# Patient Record
Sex: FEMALE | Race: BLACK OR AFRICAN AMERICAN | Employment: FULL TIME | URBAN - METROPOLITAN AREA
[De-identification: names, ages, dates, MRNs, and addresses within clinical notes are randomized per-mention and may not be internally consistent; named-entity substitution may affect disease eponyms.]

---

## 2017-02-06 PROBLEM — R74.8 ELEVATED ALKALINE PHOSPHATASE LEVEL: Status: ACTIVE | Noted: 2017-02-06

## 2017-02-10 ENCOUNTER — TELEPHONE (OUTPATIENT)
Dept: INTERNAL MEDICINE CLINIC | Facility: CLINIC | Age: 47
End: 2017-02-10

## 2017-02-10 DIAGNOSIS — E66.9 OBESITY, UNSPECIFIED OBESITY SEVERITY, UNSPECIFIED OBESITY TYPE: Primary | ICD-10-CM

## 2017-02-10 NOTE — TELEPHONE ENCOUNTER
Referral Request   Received:  Today       [facility] Clinical Staff       Cc: [facility] Referral Pool       Phone Number: [phone]                     .Reason for the order/referral:   PCP: Dr Vance Comp   Refer to Provider (first and last n

## 2017-04-17 ENCOUNTER — OFFICE VISIT (OUTPATIENT)
Dept: INTERNAL MEDICINE CLINIC | Facility: CLINIC | Age: 47
End: 2017-04-17

## 2017-04-17 VITALS
SYSTOLIC BLOOD PRESSURE: 114 MMHG | HEART RATE: 76 BPM | BODY MASS INDEX: 33.21 KG/M2 | WEIGHT: 180.5 LBS | TEMPERATURE: 98 F | RESPIRATION RATE: 14 BRPM | OXYGEN SATURATION: 98 % | HEIGHT: 62 IN | DIASTOLIC BLOOD PRESSURE: 82 MMHG

## 2017-04-17 DIAGNOSIS — J01.10 ACUTE NON-RECURRENT FRONTAL SINUSITIS: Primary | ICD-10-CM

## 2017-04-17 PROCEDURE — 99213 OFFICE O/P EST LOW 20 MIN: CPT | Performed by: FAMILY MEDICINE

## 2017-04-17 RX ORDER — CEFDINIR 300 MG/1
300 CAPSULE ORAL 2 TIMES DAILY
Qty: 20 CAPSULE | Refills: 0 | Status: SHIPPED | OUTPATIENT
Start: 2017-04-17 | End: 2017-05-11

## 2017-04-17 NOTE — PROGRESS NOTES
HPI:    Patient ID: Kristina Dave is a 48year old female. HPI  HPI:   Kristina Dave is a 48year old female who presents for upper respiratory symptoms for  1  months.  Patient reports aches and cough low grade fevers that have not gotten better  ha issues  HEENT: nasal congestion  LUNGS: denies shortness of breath with exertion  CARDIOVASCULAR: denies chest pain   GI: no nausea or abdominal pain  NEURO: denies headaches    EXAM:   /82 mmHg  Pulse 76  Temp(Src) 98.2 °F (36.8 °C) (Oral)  Resp 14

## 2017-05-11 ENCOUNTER — OFFICE VISIT (OUTPATIENT)
Dept: INTERNAL MEDICINE CLINIC | Facility: CLINIC | Age: 47
End: 2017-05-11

## 2017-05-11 VITALS
WEIGHT: 184 LBS | HEIGHT: 62 IN | HEART RATE: 78 BPM | BODY MASS INDEX: 33.86 KG/M2 | SYSTOLIC BLOOD PRESSURE: 118 MMHG | DIASTOLIC BLOOD PRESSURE: 76 MMHG | RESPIRATION RATE: 16 BRPM

## 2017-05-11 DIAGNOSIS — E66.9 OBESITY (BMI 30-39.9): ICD-10-CM

## 2017-05-11 DIAGNOSIS — R94.31 ABNORMAL EKG: ICD-10-CM

## 2017-05-11 DIAGNOSIS — Z51.81 ENCOUNTER FOR THERAPEUTIC DRUG MONITORING: Primary | ICD-10-CM

## 2017-05-11 PROCEDURE — 93000 ELECTROCARDIOGRAM COMPLETE: CPT | Performed by: INTERNAL MEDICINE

## 2017-05-11 PROCEDURE — 99203 OFFICE O/P NEW LOW 30 MIN: CPT | Performed by: INTERNAL MEDICINE

## 2017-05-11 RX ORDER — PHENTERMINE HYDROCHLORIDE 37.5 MG/1
37.5 TABLET ORAL
Qty: 30 TABLET | Refills: 0 | Status: SHIPPED | OUTPATIENT
Start: 2017-05-11 | End: 2017-06-16

## 2017-05-11 NOTE — PROGRESS NOTES
CC: Patient presents with:  Weight Problem       HPI:   Obesity, wt gain over the past 3 years, severity moderate in nature, with associated joints pain, have tried working out and diet with some results.       Wt Readings from Last 6 Encounters:  05/11/17 unspecified     Rheumatoid arthritis(714.0)     Anemia, unspecified     Essential hypertension, benign     Colonic stricture (HCC)     Mild intermittent asthma without complication     Crohn's colitis, with rectal bleeding (HCC)     Elevated alkaline phosp MUSCULOSKELETAL: back is not tender,FROM of the back  EXTREMITIES: no cyanosis, no clubbing, no edema  NEURO: motor and sensory are grossly intact, Reflexes 2+ bilaterally.        Orders Placed This Encounter  HGB A1C  Leptin, Serum  Vitamin B12  VITAMIN

## 2017-05-15 ENCOUNTER — OFFICE VISIT (OUTPATIENT)
Dept: INTERNAL MEDICINE CLINIC | Facility: CLINIC | Age: 47
End: 2017-05-15

## 2017-05-15 VITALS — BODY MASS INDEX: 33 KG/M2 | WEIGHT: 178.63 LBS

## 2017-05-15 DIAGNOSIS — E66.9 OBESITY (BMI 30.0-34.9): ICD-10-CM

## 2017-05-15 PROCEDURE — 97802 MEDICAL NUTRITION INDIV IN: CPT | Performed by: DIETITIAN, REGISTERED

## 2017-05-15 NOTE — PROGRESS NOTES
INITIAL OUTPATIENT NUTRITION CONSULTATION    Nutrition Assessment    Medical Diagnosis: Obesity, NAFLD    Client Hx: 48year old female,  with children    Problem List as of 5/15/2017        Cardiovascular    Essential hypertension, benign disease.     ----------    HDL CHOLESTEROL   Date Value Ref Range Status   04/15/2011 44* > OR = 46 mg/dL Final   ----------    AST   Date Value Ref Range Status   12/08/2016 21 15-37 U/L Final   07/27/2015 21 10 - 35 U/L Final   03/10/2015 19 15-41 U/L Fi diet and eating pattern is now healthy. Describes herself as addicted to sugar. Would stop at store on way home from work nightly and buy several bags of candy and eat to relieve stress. Also ate excess starches, particularly bread.   Has eliminated cand

## 2017-05-25 ENCOUNTER — LAB ENCOUNTER (OUTPATIENT)
Dept: LAB | Age: 47
End: 2017-05-25
Attending: INTERNAL MEDICINE
Payer: COMMERCIAL

## 2017-05-25 DIAGNOSIS — E66.9 OBESITY (BMI 30-39.9): ICD-10-CM

## 2017-05-25 DIAGNOSIS — R74.8 ELEVATED ALKALINE PHOSPHATASE LEVEL: ICD-10-CM

## 2017-05-25 DIAGNOSIS — I10 ESSENTIAL HYPERTENSION, BENIGN: ICD-10-CM

## 2017-05-25 DIAGNOSIS — Z51.81 ENCOUNTER FOR THERAPEUTIC DRUG MONITORING: ICD-10-CM

## 2017-05-25 DIAGNOSIS — K50.111 CROHN'S COLITIS, WITH RECTAL BLEEDING (HCC): ICD-10-CM

## 2017-05-25 PROCEDURE — 80048 BASIC METABOLIC PNL TOTAL CA: CPT

## 2017-05-25 PROCEDURE — 82397 CHEMILUMINESCENT ASSAY: CPT

## 2017-05-25 PROCEDURE — 82306 VITAMIN D 25 HYDROXY: CPT

## 2017-05-25 PROCEDURE — 84443 ASSAY THYROID STIM HORMONE: CPT

## 2017-05-25 PROCEDURE — 83036 HEMOGLOBIN GLYCOSYLATED A1C: CPT

## 2017-05-25 PROCEDURE — 80076 HEPATIC FUNCTION PANEL: CPT

## 2017-05-25 PROCEDURE — 82607 VITAMIN B-12: CPT

## 2017-05-25 PROCEDURE — 36415 COLL VENOUS BLD VENIPUNCTURE: CPT

## 2017-05-25 PROCEDURE — 80061 LIPID PANEL: CPT

## 2017-05-25 PROCEDURE — 85025 COMPLETE CBC W/AUTO DIFF WBC: CPT

## 2017-05-31 ENCOUNTER — HOSPITAL ENCOUNTER (OUTPATIENT)
Dept: CV DIAGNOSTICS | Facility: HOSPITAL | Age: 47
Discharge: HOME | End: 2017-05-31
Attending: INTERNAL MEDICINE
Payer: COMMERCIAL

## 2017-05-31 DIAGNOSIS — R94.31 ABNORMAL EKG: ICD-10-CM

## 2017-05-31 PROCEDURE — 93350 STRESS TTE ONLY: CPT | Performed by: INTERNAL MEDICINE

## 2017-05-31 PROCEDURE — 93018 CV STRESS TEST I&R ONLY: CPT | Performed by: INTERNAL MEDICINE

## 2017-05-31 PROCEDURE — 93017 CV STRESS TEST TRACING ONLY: CPT | Performed by: INTERNAL MEDICINE

## 2017-06-05 RX ORDER — PHENTERMINE HYDROCHLORIDE 37.5 MG/1
TABLET ORAL
Qty: 30 TABLET | Refills: 0 | OUTPATIENT
Start: 2017-06-05

## 2017-06-05 NOTE — PROGRESS NOTES
Quick Note:    Elevated cholesterol  Slightly elevated creatinine, push fluids and recheck BMP in 2 weeks  F/U as directed  ______

## 2017-06-05 NOTE — TELEPHONE ENCOUNTER
Requesting Phentermine   LOV: 5/11/17  RTC: 4 weeks   Last Labs: n/a   Filled: 5/11/17 #30 with 0 refills    Future Appointments  Date Time Provider Maryam Amezcua   6/16/2017 4:45 PM Marcus Álvarez MD [address]   7/20/2017 5:15 PM Marcus Álvarez

## 2017-06-06 ENCOUNTER — TELEPHONE (OUTPATIENT)
Dept: FAMILY MEDICINE CLINIC | Facility: CLINIC | Age: 47
End: 2017-06-06

## 2017-06-06 NOTE — TELEPHONE ENCOUNTER
Patient called regarding her phentermine. She only has 4 pills left and her next appt isn't until 6/16.  She would like to know what she should do since she doesn't have enough pills to last her to her appt and we denied the refill request.

## 2017-06-06 NOTE — TELEPHONE ENCOUNTER
Patient informed that she can stretch out the dosing by taking every other day, if she is out of medication it will not cause harm to her. She will get refill at office visit.

## 2017-06-16 ENCOUNTER — OFFICE VISIT (OUTPATIENT)
Dept: INTERNAL MEDICINE CLINIC | Facility: CLINIC | Age: 47
End: 2017-06-16

## 2017-06-16 VITALS
RESPIRATION RATE: 16 BRPM | HEART RATE: 76 BPM | HEIGHT: 62 IN | BODY MASS INDEX: 31.1 KG/M2 | WEIGHT: 169 LBS | DIASTOLIC BLOOD PRESSURE: 70 MMHG | SYSTOLIC BLOOD PRESSURE: 110 MMHG

## 2017-06-16 DIAGNOSIS — E66.9 OBESITY (BMI 30-39.9): ICD-10-CM

## 2017-06-16 DIAGNOSIS — Z51.81 ENCOUNTER FOR THERAPEUTIC DRUG MONITORING: Primary | ICD-10-CM

## 2017-06-16 DIAGNOSIS — E78.5 HYPERLIPIDEMIA, UNSPECIFIED HYPERLIPIDEMIA TYPE: ICD-10-CM

## 2017-06-16 PROCEDURE — 99213 OFFICE O/P EST LOW 20 MIN: CPT | Performed by: INTERNAL MEDICINE

## 2017-06-16 RX ORDER — PHENTERMINE HYDROCHLORIDE 37.5 MG/1
37.5 TABLET ORAL
Qty: 30 TABLET | Refills: 0 | Status: SHIPPED | OUTPATIENT
Start: 2017-06-16 | End: 2017-07-20

## 2017-06-16 NOTE — PROGRESS NOTES
CC: Patient presents with:  Weight Check: down 15 lb       HPI:   Obesity, doing well on phentermine. No chest pain. Feels like it's helping.        Current Outpatient Prescriptions:  Phentermine HCl 37.5 MG Oral Tab Take 1 tablet (37.5 mg total) by m MG Oral Tab 30 tablet 0      Sig: Take 1 tablet (37.5 mg total) by mouth every morning before breakfast.          None     ASSESSMENT:   Encounter for therapeutic drug monitoring  (primary encounter diagnosis)  Obesity (BMI 30-39. 9)  Hyperlipidemia, unspec

## 2017-06-20 ENCOUNTER — OFFICE VISIT (OUTPATIENT)
Dept: INTERNAL MEDICINE CLINIC | Facility: CLINIC | Age: 47
End: 2017-06-20

## 2017-06-20 VITALS
SYSTOLIC BLOOD PRESSURE: 114 MMHG | OXYGEN SATURATION: 98 % | BODY MASS INDEX: 30 KG/M2 | WEIGHT: 166.5 LBS | TEMPERATURE: 98 F | DIASTOLIC BLOOD PRESSURE: 80 MMHG | HEART RATE: 85 BPM | RESPIRATION RATE: 16 BRPM

## 2017-06-20 DIAGNOSIS — H10.9 CONJUNCTIVITIS OF BOTH EYES, UNSPECIFIED CONJUNCTIVITIS TYPE: Primary | ICD-10-CM

## 2017-06-20 DIAGNOSIS — J45.20 MILD INTERMITTENT ASTHMA WITHOUT COMPLICATION: ICD-10-CM

## 2017-06-20 DIAGNOSIS — L91.8 INFLAMED SKIN TAG: ICD-10-CM

## 2017-06-20 PROCEDURE — 99214 OFFICE O/P EST MOD 30 MIN: CPT | Performed by: FAMILY MEDICINE

## 2017-06-20 RX ORDER — TOBRAMYCIN AND DEXAMETHASONE 3; 1 MG/ML; MG/ML
2 SUSPENSION/ DROPS OPHTHALMIC 4 TIMES DAILY
Qty: 1 BOTTLE | Refills: 0 | Status: SHIPPED | OUTPATIENT
Start: 2017-06-20 | End: 2017-07-20

## 2017-06-20 NOTE — PROGRESS NOTES
CHIEF COMPLAINT:   Patient presents with:  Sty: on right eye since this morning along with right eye crust.  Yellow drainage in both eyes x 1 month. Itchy eyes x 2-3 months. Has tried Claritin and Visine Allergy.          HPI:   Kiera Payment is a 48 ye daily. Disp:  Rfl:    Multiple Vitamin (MULTIVITAMIN OR) Take 1 Tab by mouth daily.  Disp:  Rfl:       Past Medical History   Diagnosis Date   • Sinusitis 9/16/2013   • [facility]    • Extrinsic asthma, unspecified           Past Surgical History AND PLAN:   Gabriela Guardado is a 48year old female who presents with:    ASSESSMENT:   Conjunctivitis of both eyes, unspecified conjunctivitis type  (primary encounter diagnosis)  Inflamed skin tag  Mild intermittent asthma without complication    No orde

## 2017-07-14 ENCOUNTER — TELEPHONE (OUTPATIENT)
Dept: INTERNAL MEDICINE CLINIC | Facility: CLINIC | Age: 47
End: 2017-07-14

## 2017-07-14 DIAGNOSIS — R21 RASH: Primary | ICD-10-CM

## 2017-07-14 NOTE — TELEPHONE ENCOUNTER
Patient Name: Anisha Patel   : 1970   Reason for the order/referral: Rash on body   PCP: Flynn Donald MD   Refer to Provider (first and last name): Recommendation   Specialty: Derm   Patient Insurance: Payor: Katia Vizcarra / Plan: Emil Hoffman /

## 2017-07-20 ENCOUNTER — OFFICE VISIT (OUTPATIENT)
Dept: INTERNAL MEDICINE CLINIC | Facility: CLINIC | Age: 47
End: 2017-07-20

## 2017-07-20 VITALS
RESPIRATION RATE: 16 BRPM | HEART RATE: 86 BPM | HEIGHT: 62 IN | BODY MASS INDEX: 29.63 KG/M2 | SYSTOLIC BLOOD PRESSURE: 122 MMHG | WEIGHT: 161 LBS | DIASTOLIC BLOOD PRESSURE: 76 MMHG

## 2017-07-20 DIAGNOSIS — E66.9 OBESITY (BMI 30-39.9): ICD-10-CM

## 2017-07-20 DIAGNOSIS — Z51.81 ENCOUNTER FOR THERAPEUTIC DRUG MONITORING: Primary | ICD-10-CM

## 2017-07-20 PROCEDURE — 99213 OFFICE O/P EST LOW 20 MIN: CPT | Performed by: INTERNAL MEDICINE

## 2017-07-20 RX ORDER — TOPIRAMATE 25 MG/1
25 TABLET ORAL 2 TIMES DAILY
Qty: 60 TABLET | Refills: 5 | Status: SHIPPED | OUTPATIENT
Start: 2017-07-20 | End: 2017-08-17

## 2017-07-20 RX ORDER — PHENTERMINE HYDROCHLORIDE 37.5 MG/1
37.5 TABLET ORAL
Qty: 30 TABLET | Refills: 0 | Status: SHIPPED | OUTPATIENT
Start: 2017-07-20 | End: 2017-08-17

## 2017-07-20 NOTE — PROGRESS NOTES
CC: Patient presents with:  Weight Check: down 8 lbs       HPI:   Obesity, doing well on phentermine. Worsening cravings later in the day.        Current Outpatient Prescriptions:  Phentermine HCl 37.5 MG Oral Tab Take 1 tablet (37.5 mg total) by mouth Disp Refills    Phentermine HCl 37.5 MG Oral Tab 30 tablet 0      Sig: Take 1 tablet (37.5 mg total) by mouth every morning before breakfast.      topiramate (TOPAMAX) 25 MG Oral Tab 60 tablet 5      Sig: Take 1 tablet (25 mg total) by mouth 2 (two) times

## 2017-08-09 RX ORDER — LISINOPRIL 40 MG/1
40 TABLET ORAL DAILY
Qty: 90 TABLET | Refills: 0 | Status: SHIPPED | OUTPATIENT
Start: 2017-08-09 | End: 2017-09-14

## 2017-08-14 RX ORDER — LISINOPRIL 40 MG/1
40 TABLET ORAL DAILY
Qty: 90 TABLET | Refills: 0 | OUTPATIENT
Start: 2017-08-14

## 2017-08-17 ENCOUNTER — OFFICE VISIT (OUTPATIENT)
Dept: INTERNAL MEDICINE CLINIC | Facility: CLINIC | Age: 47
End: 2017-08-17

## 2017-08-17 VITALS
HEIGHT: 62 IN | HEART RATE: 84 BPM | BODY MASS INDEX: 28.71 KG/M2 | SYSTOLIC BLOOD PRESSURE: 116 MMHG | DIASTOLIC BLOOD PRESSURE: 70 MMHG | WEIGHT: 156 LBS | RESPIRATION RATE: 16 BRPM

## 2017-08-17 DIAGNOSIS — E66.9 OBESITY (BMI 30-39.9): ICD-10-CM

## 2017-08-17 DIAGNOSIS — Z51.81 ENCOUNTER FOR THERAPEUTIC DRUG MONITORING: Primary | ICD-10-CM

## 2017-08-17 PROCEDURE — 99213 OFFICE O/P EST LOW 20 MIN: CPT | Performed by: INTERNAL MEDICINE

## 2017-08-17 RX ORDER — PHENTERMINE HYDROCHLORIDE 37.5 MG/1
37.5 TABLET ORAL
Qty: 30 TABLET | Refills: 0 | Status: SHIPPED | OUTPATIENT
Start: 2017-08-17 | End: 2017-09-14

## 2017-08-17 RX ORDER — TOPIRAMATE 50 MG/1
50 TABLET, FILM COATED ORAL 2 TIMES DAILY
Qty: 60 TABLET | Refills: 5 | Status: SHIPPED | OUTPATIENT
Start: 2017-08-17 | End: 2018-04-16

## 2017-08-17 NOTE — PROGRESS NOTES
CC: Patient presents with:  Weight Check: down 5 lbs       HPI:   Obesity, doing well on phentermine and topamax, worsening hunger still later in the day.        Current Outpatient Prescriptions:  topiramate 50 MG Oral Tab Take 1 tablet (50 mg total) by Prescriptions Disp Refills    topiramate 50 MG Oral Tab 60 tablet 5      Sig: Take 1 tablet (50 mg total) by mouth 2 (two) times daily.       Phentermine HCl 37.5 MG Oral Tab 30 tablet 0      Sig: Take 1 tablet (37.5 mg total) by mouth every morning before

## 2017-09-03 ENCOUNTER — APPOINTMENT (OUTPATIENT)
Dept: CT IMAGING | Age: 47
End: 2017-09-03
Attending: EMERGENCY MEDICINE
Payer: COMMERCIAL

## 2017-09-03 ENCOUNTER — HOSPITAL ENCOUNTER (EMERGENCY)
Age: 47
Discharge: HOME | End: 2017-09-03
Attending: EMERGENCY MEDICINE
Payer: COMMERCIAL

## 2017-09-03 VITALS
TEMPERATURE: 98 F | WEIGHT: 152 LBS | RESPIRATION RATE: 18 BRPM | HEART RATE: 89 BPM | HEIGHT: 62 IN | DIASTOLIC BLOOD PRESSURE: 74 MMHG | OXYGEN SATURATION: 99 % | BODY MASS INDEX: 27.97 KG/M2 | SYSTOLIC BLOOD PRESSURE: 125 MMHG

## 2017-09-03 DIAGNOSIS — R51.9 NONINTRACTABLE HEADACHE, UNSPECIFIED CHRONICITY PATTERN, UNSPECIFIED HEADACHE TYPE: Primary | ICD-10-CM

## 2017-09-03 DIAGNOSIS — R22.0 FACIAL MASS: ICD-10-CM

## 2017-09-03 PROCEDURE — 70450 CT HEAD/BRAIN W/O DYE: CPT | Performed by: EMERGENCY MEDICINE

## 2017-09-03 PROCEDURE — 99284 EMERGENCY DEPT VISIT MOD MDM: CPT

## 2017-09-03 RX ORDER — TRAMADOL HYDROCHLORIDE 50 MG/1
50 TABLET ORAL EVERY 4 HOURS PRN
Qty: 20 TABLET | Refills: 0 | Status: SHIPPED | OUTPATIENT
Start: 2017-09-03 | End: 2017-09-10

## 2017-09-03 RX ORDER — AMOXICILLIN 500 MG/1
500 TABLET, FILM COATED ORAL 3 TIMES DAILY
Qty: 30 TABLET | Refills: 0 | Status: SHIPPED | OUTPATIENT
Start: 2017-09-03 | End: 2017-09-13

## 2017-09-03 NOTE — ED PROVIDER NOTES
Patient Seen in: [facility] [place]    History   Patient presents with:  Headache (neurologic)  Rash Skin Problem (integumentary)    Stated Complaint: HEADACHE X2 WEEKS, RASH ON UPPER TORSO FOR PAST WEEK AND RECENTLY DX WITH DERMA* Subcutaneous Pen-injector Kit,  Inject into the skin. amoxicillin 500 MG Oral Tab,  Take 1 tablet (500 mg total) by mouth 3 (three) times daily. TraMADol HCl 50 MG Oral Tab,  Take 1 tablet (50 mg total) by mouth every 4 (four) hours as needed for Pain. 1653]  BP: 127/76  Pulse: 99  Resp: 16  Temp: 98 °F (36.7 °C)  Temp src: Temporal  SpO2: 98 %  O2 Device: None (Room air)    Current:/74   Pulse 89   Temp 98 °F (36.7 °C) (Temporal)   Resp 18   Ht 157.5 cm (5' 2\")   Wt 68.9 kg   SpO2 99%   BMI 27.80 represent a reactive lymph node among other benign or malignant etiologies. Clinical correlation with physical exam and outpatient followup is recommended.      Dictated by: Torri Hartman MD on 9/03/2017 at 18:24     Approved by: Torri Hartman MD

## 2017-09-14 ENCOUNTER — OFFICE VISIT (OUTPATIENT)
Dept: INTERNAL MEDICINE CLINIC | Facility: CLINIC | Age: 47
End: 2017-09-14

## 2017-09-14 VITALS
BODY MASS INDEX: 27.79 KG/M2 | HEIGHT: 62 IN | DIASTOLIC BLOOD PRESSURE: 78 MMHG | WEIGHT: 151 LBS | HEART RATE: 78 BPM | SYSTOLIC BLOOD PRESSURE: 118 MMHG | RESPIRATION RATE: 16 BRPM

## 2017-09-14 DIAGNOSIS — Z51.81 ENCOUNTER FOR THERAPEUTIC DRUG MONITORING: Primary | ICD-10-CM

## 2017-09-14 DIAGNOSIS — E66.9 OBESITY (BMI 30-39.9): ICD-10-CM

## 2017-09-14 DIAGNOSIS — I10 ESSENTIAL HYPERTENSION, BENIGN: ICD-10-CM

## 2017-09-14 PROCEDURE — 99213 OFFICE O/P EST LOW 20 MIN: CPT | Performed by: INTERNAL MEDICINE

## 2017-09-14 RX ORDER — PHENTERMINE HYDROCHLORIDE 37.5 MG/1
37.5 TABLET ORAL
Qty: 30 TABLET | Refills: 0 | Status: SHIPPED | OUTPATIENT
Start: 2017-09-14 | End: 2018-04-16

## 2017-09-14 RX ORDER — LISINOPRIL 20 MG/1
40 TABLET ORAL DAILY
Qty: 30 TABLET | Refills: 3 | Status: SHIPPED | OUTPATIENT
Start: 2017-09-14 | End: 2017-12-20

## 2017-09-14 NOTE — PROGRESS NOTES
CC: Patient presents with:  Weight Check: down 5 lb       HPI:   Obesity, doing well on phentermine and topamax. Some dizziness with standing at times.        Current Outpatient Prescriptions:  Phentermine HCl 37.5 MG Oral Tab Take 1 tablet (37.5 mg tot therapeutic drug monitoring     Hyperlipidemia        REVIEW OF SYSTEMS:   CARDIOVASCULAR: denies chest pain    EXAM:   /78   Pulse 78   Resp 16   Ht 62\"   Wt 151 lb   BMI 27.62 kg/m²   GENERAL: A/O x3  HEENT:  NCAT, PERRLA  LUNGS: CTA bilat   CARDI

## 2017-10-05 ENCOUNTER — OFFICE VISIT (OUTPATIENT)
Dept: INTERNAL MEDICINE CLINIC | Facility: CLINIC | Age: 47
End: 2017-10-05

## 2017-10-05 VITALS
TEMPERATURE: 99 F | HEART RATE: 88 BPM | HEIGHT: 62 IN | BODY MASS INDEX: 27.97 KG/M2 | RESPIRATION RATE: 15 BRPM | DIASTOLIC BLOOD PRESSURE: 82 MMHG | WEIGHT: 152 LBS | SYSTOLIC BLOOD PRESSURE: 134 MMHG

## 2017-10-05 DIAGNOSIS — R59.9 ENLARGED LYMPH NODE: ICD-10-CM

## 2017-10-05 DIAGNOSIS — B02.8 HERPES ZOSTER WITH COMPLICATION: Primary | ICD-10-CM

## 2017-10-05 PROCEDURE — 99213 OFFICE O/P EST LOW 20 MIN: CPT | Performed by: FAMILY MEDICINE

## 2017-10-05 RX ORDER — HYDROCODONE BITARTRATE AND ACETAMINOPHEN 5; 325 MG/1; MG/1
1 TABLET ORAL EVERY 4 HOURS PRN
Qty: 30 TABLET | Refills: 0 | Status: SHIPPED | OUTPATIENT
Start: 2017-10-05 | End: 2018-01-25

## 2017-10-05 RX ORDER — VALACYCLOVIR HYDROCHLORIDE 1 G/1
1 TABLET, FILM COATED ORAL 3 TIMES DAILY
Qty: 21 TABLET | Refills: 0 | Status: SHIPPED | OUTPATIENT
Start: 2017-10-05 | End: 2017-10-12

## 2017-10-05 NOTE — PROGRESS NOTES
HPI:    Patient ID: Anisha Patel is a 50year old female.     HPI  Here for rash for a week  But was having itching prior to it   This is more generlized on the torso  But has a rash on the R medial elbow that is painful  Cannot touch it w clothes   No D Allergies:  Peanuts                 Nausea and vomiting, Swelling,                            Tongue Swelling   PHYSICAL EXAM:   Physical Exam   Constitutional: No distress. HENT:   LN noted in preauricular area    Neurological: She is alert.    Skin:

## 2017-10-23 DIAGNOSIS — R21 RASH AND OTHER NONSPECIFIC SKIN ERUPTION: Primary | ICD-10-CM

## 2017-10-30 PROCEDURE — 80299 QUANTITATIVE ASSAY DRUG: CPT | Performed by: INTERNAL MEDICINE

## 2017-12-20 ENCOUNTER — TELEPHONE (OUTPATIENT)
Dept: INTERNAL MEDICINE CLINIC | Facility: CLINIC | Age: 47
End: 2017-12-20

## 2017-12-20 RX ORDER — LISINOPRIL 20 MG/1
40 TABLET ORAL DAILY
Qty: 30 TABLET | Refills: 3 | Status: CANCELLED | OUTPATIENT
Start: 2017-12-20

## 2017-12-20 RX ORDER — LISINOPRIL 20 MG/1
40 TABLET ORAL DAILY
Qty: 30 TABLET | Refills: 3 | Status: SHIPPED | OUTPATIENT
Start: 2017-12-20 | End: 2018-04-16

## 2017-12-22 RX ORDER — LISINOPRIL 40 MG/1
TABLET ORAL
Qty: 90 TABLET | Refills: 0 | OUTPATIENT
Start: 2017-12-22

## 2018-01-08 ENCOUNTER — LAB ENCOUNTER (OUTPATIENT)
Dept: LAB | Facility: HOSPITAL | Age: 48
End: 2018-01-08
Attending: INTERNAL MEDICINE
Payer: COMMERCIAL

## 2018-01-08 DIAGNOSIS — Z51.81 THERAPEUTIC DRUG MONITORING: ICD-10-CM

## 2018-01-08 DIAGNOSIS — R74.8 ELEVATED ALKALINE PHOSPHATASE LEVEL: ICD-10-CM

## 2018-01-08 DIAGNOSIS — K50.111 CROHN'S COLITIS, WITH RECTAL BLEEDING (HCC): ICD-10-CM

## 2018-01-08 LAB
ALBUMIN SERPL-MCNC: 3.6 G/DL (ref 3.5–4.8)
ALP LIVER SERPL-CCNC: 123 U/L (ref 39–100)
ALT SERPL-CCNC: 36 U/L (ref 14–54)
AST SERPL-CCNC: 24 U/L (ref 15–41)
BASOPHILS # BLD AUTO: 0.09 X10(3) UL (ref 0–0.1)
BASOPHILS NFR BLD AUTO: 0.9 %
BILIRUB DIRECT SERPL-MCNC: 0.1 MG/DL (ref 0.1–0.5)
BILIRUB SERPL-MCNC: 0.5 MG/DL (ref 0.1–2)
EOSINOPHIL # BLD AUTO: 1.23 X10(3) UL (ref 0–0.3)
EOSINOPHIL NFR BLD AUTO: 12.4 %
ERYTHROCYTE [DISTWIDTH] IN BLOOD BY AUTOMATED COUNT: 12.2 % (ref 11.5–16)
HCT VFR BLD AUTO: 36.1 % (ref 34–50)
HGB BLD-MCNC: 12.1 G/DL (ref 12–16)
IMMATURE GRANULOCYTE COUNT: 0.1 X10(3) UL (ref 0–1)
IMMATURE GRANULOCYTE RATIO %: 1 %
LYMPHOCYTES # BLD AUTO: 3.83 X10(3) UL (ref 0.9–4)
LYMPHOCYTES NFR BLD AUTO: 38.7 %
M PROTEIN MFR SERPL ELPH: 7.4 G/DL (ref 6.1–8.3)
MCH RBC QN AUTO: 33.7 PG (ref 27–33.2)
MCHC RBC AUTO-ENTMCNC: 33.5 G/DL (ref 31–37)
MCV RBC AUTO: 100.6 FL (ref 81–100)
MONOCYTES # BLD AUTO: 0.5 X10(3) UL (ref 0.1–0.6)
MONOCYTES NFR BLD AUTO: 5.1 %
NEUTROPHIL ABS PRELIM: 4.15 X10 (3) UL (ref 1.3–6.7)
NEUTROPHILS # BLD AUTO: 4.15 X10(3) UL (ref 1.3–6.7)
NEUTROPHILS NFR BLD AUTO: 41.9 %
PLATELET # BLD AUTO: 336 10(3)UL (ref 150–450)
RBC # BLD AUTO: 3.59 X10(6)UL (ref 3.8–5.1)
RED CELL DISTRIBUTION WIDTH-SD: 45.7 FL (ref 35.1–46.3)
WBC # BLD AUTO: 9.9 X10(3) UL (ref 4–13)

## 2018-01-08 PROCEDURE — 85025 COMPLETE CBC W/AUTO DIFF WBC: CPT

## 2018-01-08 PROCEDURE — 86352 CELL FUNCTION ASSAY W/STIM: CPT

## 2018-01-08 PROCEDURE — 36415 COLL VENOUS BLD VENIPUNCTURE: CPT

## 2018-01-08 PROCEDURE — 80076 HEPATIC FUNCTION PANEL: CPT

## 2018-01-11 LAB
ADALIMUMAB ACTIVITY: 7.83 UG/ML
ADALIMUMAB NEUTRALIZING ANTIBODY: NOT DETECTED

## 2018-01-29 PROCEDURE — 88305 TISSUE EXAM BY PATHOLOGIST: CPT | Performed by: INTERNAL MEDICINE

## 2018-04-10 ENCOUNTER — PATIENT OUTREACH (OUTPATIENT)
Dept: INTERNAL MEDICINE CLINIC | Facility: CLINIC | Age: 48
End: 2018-04-10

## 2018-04-16 ENCOUNTER — OFFICE VISIT (OUTPATIENT)
Dept: INTERNAL MEDICINE CLINIC | Facility: CLINIC | Age: 48
End: 2018-04-16

## 2018-04-16 VITALS
WEIGHT: 182 LBS | TEMPERATURE: 98 F | BODY MASS INDEX: 33 KG/M2 | OXYGEN SATURATION: 99 % | SYSTOLIC BLOOD PRESSURE: 106 MMHG | HEART RATE: 93 BPM | DIASTOLIC BLOOD PRESSURE: 76 MMHG

## 2018-04-16 DIAGNOSIS — I10 ESSENTIAL HYPERTENSION, BENIGN: Primary | ICD-10-CM

## 2018-04-16 DIAGNOSIS — Z12.31 VISIT FOR SCREENING MAMMOGRAM: ICD-10-CM

## 2018-04-16 PROCEDURE — 99213 OFFICE O/P EST LOW 20 MIN: CPT | Performed by: FAMILY MEDICINE

## 2018-04-16 RX ORDER — LISINOPRIL 20 MG/1
20 TABLET ORAL DAILY
Qty: 90 TABLET | Refills: 1 | Status: SHIPPED | OUTPATIENT
Start: 2018-04-16 | End: 2018-12-06

## 2018-04-16 NOTE — PROGRESS NOTES
HPI:    Patient ID: Kristina Dave is a 50year old female. HPI  Kristina Dave is a 50year old female. HPI:   Patient presents for recheck of her hypertension.  Pt has been taking medication as instructed, no medication side effects, no home BP m 3   Albuterol Sulfate HFA (PROAIR HFA) 108 (90 BASE) MCG/ACT Inhalation Aero Soln Inhale 2 puffs into the lungs every 4 (four) hours as needed. For wheezing Disp: 6.7 g Rfl: 2   Cetirizine HCl (ZYRTEC ALLERGY OR) Take by mouth daily.  Disp:  Rfl:    Multipl her hypertension. BP is well controlled, no significant medication side effects noted. PLAN: will continue present medications, ck labs  soon as well as  MMG   discussed weight gain   .  The patient indicates understanding of these issues and agrees to the BILAT ([id number])       [id number]

## 2018-04-19 ENCOUNTER — APPOINTMENT (OUTPATIENT)
Dept: CT IMAGING | Age: 48
End: 2018-04-19
Attending: EMERGENCY MEDICINE
Payer: COMMERCIAL

## 2018-04-19 ENCOUNTER — HOSPITAL ENCOUNTER (EMERGENCY)
Age: 48
Discharge: HOME | End: 2018-04-19
Attending: EMERGENCY MEDICINE
Payer: COMMERCIAL

## 2018-04-19 VITALS
HEIGHT: 62 IN | TEMPERATURE: 98 F | SYSTOLIC BLOOD PRESSURE: 110 MMHG | WEIGHT: 182 LBS | HEART RATE: 74 BPM | BODY MASS INDEX: 33.49 KG/M2 | RESPIRATION RATE: 18 BRPM | OXYGEN SATURATION: 100 % | DIASTOLIC BLOOD PRESSURE: 81 MMHG

## 2018-04-19 DIAGNOSIS — R19.7 DIARRHEA, UNSPECIFIED TYPE: ICD-10-CM

## 2018-04-19 DIAGNOSIS — N39.0 URINARY TRACT INFECTION WITHOUT HEMATURIA, SITE UNSPECIFIED: Primary | ICD-10-CM

## 2018-04-19 PROCEDURE — 87077 CULTURE AEROBIC IDENTIFY: CPT | Performed by: EMERGENCY MEDICINE

## 2018-04-19 PROCEDURE — 87086 URINE CULTURE/COLONY COUNT: CPT | Performed by: EMERGENCY MEDICINE

## 2018-04-19 PROCEDURE — 96361 HYDRATE IV INFUSION ADD-ON: CPT

## 2018-04-19 PROCEDURE — 85025 COMPLETE CBC W/AUTO DIFF WBC: CPT | Performed by: EMERGENCY MEDICINE

## 2018-04-19 PROCEDURE — 80053 COMPREHEN METABOLIC PANEL: CPT | Performed by: EMERGENCY MEDICINE

## 2018-04-19 PROCEDURE — 87186 SC STD MICRODIL/AGAR DIL: CPT | Performed by: EMERGENCY MEDICINE

## 2018-04-19 PROCEDURE — 81001 URINALYSIS AUTO W/SCOPE: CPT | Performed by: EMERGENCY MEDICINE

## 2018-04-19 PROCEDURE — 96375 TX/PRO/DX INJ NEW DRUG ADDON: CPT

## 2018-04-19 PROCEDURE — 99284 EMERGENCY DEPT VISIT MOD MDM: CPT

## 2018-04-19 PROCEDURE — 74177 CT ABD & PELVIS W/CONTRAST: CPT | Performed by: EMERGENCY MEDICINE

## 2018-04-19 PROCEDURE — 96374 THER/PROPH/DIAG INJ IV PUSH: CPT

## 2018-04-19 RX ORDER — CEPHALEXIN 500 MG/1
500 CAPSULE ORAL 4 TIMES DAILY
Qty: 40 CAPSULE | Refills: 0 | Status: SHIPPED | OUTPATIENT
Start: 2018-04-19 | End: 2018-04-29

## 2018-04-19 RX ORDER — ACETAMINOPHEN 500 MG
1000 TABLET ORAL ONCE
Status: DISCONTINUED | OUTPATIENT
Start: 2018-04-19 | End: 2018-04-19

## 2018-04-19 RX ORDER — ONDANSETRON 2 MG/ML
4 INJECTION INTRAMUSCULAR; INTRAVENOUS ONCE
Status: COMPLETED | OUTPATIENT
Start: 2018-04-19 | End: 2018-04-19

## 2018-04-19 RX ORDER — HYDROMORPHONE HYDROCHLORIDE 1 MG/ML
0.5 INJECTION, SOLUTION INTRAMUSCULAR; INTRAVENOUS; SUBCUTANEOUS ONCE
Status: COMPLETED | OUTPATIENT
Start: 2018-04-19 | End: 2018-04-19

## 2018-04-19 NOTE — ED PROVIDER NOTES
Patient Seen in: [facility] In [place]    History   Patient presents with:  Abdomen/Flank Pain (GI/)    Stated Complaint: abd pain     HPI    43-year-old female who presented to the emergency room complaining of having 1 week of lef Vitals [04/19/18 4622]  BP: (!) 130/102  Pulse: 95  Resp: 16  Temp: (!) 97.5 °F (36.4 °C)  Temp src: Temporal  SpO2: 99 %  O2 Device: None (Room air)    Current:/81   Pulse 74   Temp (!) 97.5 °F (36.4 °C) (Temporal)   Resp 18   Ht 157.5 cm (5' 2\") other components within normal limits   CBC WITH DIFFERENTIAL WITH PLATELET    Narrative: The following orders were created for panel order CBC WITH DIFFERENTIAL WITH PLATELET.   Procedure                               Abnormality         Status immediate for any worsening symptoms. She was discharged in good condition.       Disposition and Plan     Clinical Impression:  Urinary tract infection without hematuria, site unspecified  (primary encounter diagnosis)  Diarrhea, unspecified type    Dispo

## 2018-04-19 NOTE — ED INITIAL ASSESSMENT (HPI)
Pt reports since Friday having left lower abd pain and watery diarrhea.  No blood in stool, nausea, denies travel

## 2018-09-12 ENCOUNTER — OFFICE VISIT (OUTPATIENT)
Dept: INTERNAL MEDICINE CLINIC | Facility: CLINIC | Age: 48
End: 2018-09-12

## 2018-09-12 VITALS
DIASTOLIC BLOOD PRESSURE: 82 MMHG | OXYGEN SATURATION: 98 % | BODY MASS INDEX: 32.57 KG/M2 | RESPIRATION RATE: 16 BRPM | TEMPERATURE: 98 F | HEART RATE: 94 BPM | SYSTOLIC BLOOD PRESSURE: 120 MMHG | WEIGHT: 177 LBS | HEIGHT: 62 IN

## 2018-09-12 DIAGNOSIS — Z12.31 VISIT FOR SCREENING MAMMOGRAM: ICD-10-CM

## 2018-09-12 DIAGNOSIS — I10 ESSENTIAL HYPERTENSION, BENIGN: Primary | ICD-10-CM

## 2018-09-12 DIAGNOSIS — R51.9 NONINTRACTABLE HEADACHE, UNSPECIFIED CHRONICITY PATTERN, UNSPECIFIED HEADACHE TYPE: ICD-10-CM

## 2018-09-12 PROCEDURE — 99214 OFFICE O/P EST MOD 30 MIN: CPT | Performed by: FAMILY MEDICINE

## 2018-09-12 RX ORDER — AMOXICILLIN 875 MG/1
875 TABLET, COATED ORAL 2 TIMES DAILY
Qty: 20 TABLET | Refills: 0 | Status: SHIPPED | OUTPATIENT
Start: 2018-09-12 | End: 2018-09-22

## 2018-09-12 NOTE — PROGRESS NOTES
HPI:    Patient ID: Ty Silveira is a 46year old female.     HPI  Here for BRANDT     Has had in the past      Entire head is involved    Woke up w it 2 d ago   Constant   Tried allegra benadryl, tylenol      Some dizziness       Pins on the R top o f the h ASSESSMENT/PLAN:  (I10) Essential hypertension, benign  (primary encounter diagnosis)  Plan: BP is OK   CPM       (R51) Nonintractable headache, unspecified chronicity pattern, unspecified headache type  Plan: she had similar presntation to the ER last S

## 2018-12-06 RX ORDER — LISINOPRIL 20 MG/1
TABLET ORAL
Qty: 90 TABLET | Refills: 0 | Status: SHIPPED | OUTPATIENT
Start: 2018-12-06 | End: 2019-03-27

## 2018-12-06 NOTE — TELEPHONE ENCOUNTER
Lisinopril approved for refill per protocol Office progress note,  May 7, 2018    Na returns today for further management of her mild microcytic anemia. Her ferritin was low at 8 and iron saturation was only 9%, so she was started on oral ferrous sulfate 3 times a day. The rest of her labs ordered on Sasha 15, 2017, were pretty unremarkable including a normal LDH negative direct Kat test, and normal levels of folate and B12. Protein electrophoresis did not demonstrate a paraprotein. Retic count was also normal at 1.8%, sedimentation rate was a little bit elevated at 39.  She was seen back in July after being on oral iron for about 3 weeks. Her hematocrit is 36, hemoglobin 11.1, which was at least stable. She continued on the iron for an additional 10 months, she is feeling well except she was  diagnosed with atrial fibrillation, more recently with frequent PVCs. An ablation is being planned. She's not had any dizziness or chest pain.     Physical exam today shows a pleasant white female in no acute distress her blood pressure is 139/67 pulses are 77 and regular. Respirations are 20, temperature 98.1. HEENT exam shows no scleral icterus. Oropharynx is benign. Neck is supple without adenopathy or thyromegaly. Back is nontender, lungs are clear to percussion and auscultation. Breast exam is deferred. Cardiac tones show an irregularly irregular rhythm with a moderate tachycardia. Abdomen is soft without hepatosplenomegaly. Rectovaginal exam is deferred. Extremities show no pedal edema or calf tenderness. Neurologically patient is alert and oriented. There is no focal weakness. Gait speech and cranial nerve functions are intact.    Laboratory data today shows a CBC with a white count 4200, hematocrit 42.6, hemoglobin 12.9, platelets 147,000. INR is 2.2.    Assess    Iron deficiency anemia, possibly with a small component of anemia of chronic disease, improved now on oral iron supplementation.    She also has recently diagnosed atrial fibrillation, Reggie oakley  is now controlled except she is also having frequent PVCs.    Plan:    At this point I think we can safely stop her iron. She will see me back in 6 months. We will recheck her iron levels at that time. She denies any further bleeding except for some blood in the urine on two brief occasions in the last month or so. Stool was guaiac-negative back last November. INR is therapeutic. She has had an EGD and a colonoscopy within the last 2 years. Call she notices further bleeding. She's not had chest pain or dizziness.

## 2018-12-07 RX ORDER — NEBULIZER ACCESSORIES
KIT MISCELLANEOUS
Qty: 1 KIT | Refills: 0 | Status: SHIPPED | OUTPATIENT
Start: 2018-12-07 | End: 2021-03-31

## 2018-12-07 RX ORDER — ALBUTEROL SULFATE 90 UG/1
2 AEROSOL, METERED RESPIRATORY (INHALATION) EVERY 4 HOURS PRN
Qty: 6.7 G | Refills: 2 | Status: SHIPPED | OUTPATIENT
Start: 2018-12-07 | End: 2020-02-07

## 2018-12-19 ENCOUNTER — TELEPHONE (OUTPATIENT)
Dept: INTERNAL MEDICINE CLINIC | Facility: CLINIC | Age: 48
End: 2018-12-19

## 2018-12-19 NOTE — TELEPHONE ENCOUNTER
Patient stated that he prescription for LISINOPRIL 20 MG Oral Tab has been denied and she would like to speak with a nurse to see why it was denied. Patient stated that she is out of her prescription.  [facility] [address] [place] [address]

## 2018-12-19 NOTE — TELEPHONE ENCOUNTER
Rx was filled on 12/06/18 for 90 pills Spoke with pharmacy and they have Rx and will get it ready for her She has 2 files with them and needs to correct this One file under Kimberly Gonzales was denied

## 2019-02-07 ENCOUNTER — OFFICE VISIT (OUTPATIENT)
Dept: INTERNAL MEDICINE CLINIC | Facility: CLINIC | Age: 49
End: 2019-02-07

## 2019-02-07 VITALS
HEART RATE: 84 BPM | WEIGHT: 186 LBS | RESPIRATION RATE: 13 BRPM | OXYGEN SATURATION: 98 % | HEIGHT: 62 IN | SYSTOLIC BLOOD PRESSURE: 126 MMHG | BODY MASS INDEX: 34.23 KG/M2 | TEMPERATURE: 99 F | DIASTOLIC BLOOD PRESSURE: 84 MMHG

## 2019-02-07 DIAGNOSIS — J01.90 ACUTE SINUSITIS, RECURRENCE NOT SPECIFIED, UNSPECIFIED LOCATION: ICD-10-CM

## 2019-02-07 DIAGNOSIS — H00.11 CHALAZION OF RIGHT UPPER EYELID: ICD-10-CM

## 2019-02-07 DIAGNOSIS — H10.9 BACTERIAL CONJUNCTIVITIS OF RIGHT EYE: Primary | ICD-10-CM

## 2019-02-07 PROCEDURE — 99213 OFFICE O/P EST LOW 20 MIN: CPT | Performed by: FAMILY MEDICINE

## 2019-02-07 RX ORDER — TOBRAMYCIN 3 MG/ML
1 SOLUTION/ DROPS OPHTHALMIC 3 TIMES DAILY
Qty: 1 BOTTLE | Refills: 0 | Status: SHIPPED | OUTPATIENT
Start: 2019-02-07 | End: 2019-02-14

## 2019-02-07 RX ORDER — AMOXICILLIN AND CLAVULANATE POTASSIUM 875; 125 MG/1; MG/1
1 TABLET, FILM COATED ORAL 2 TIMES DAILY
Qty: 20 TABLET | Refills: 0 | Status: SHIPPED | OUTPATIENT
Start: 2019-02-07 | End: 2019-02-22

## 2019-02-07 NOTE — PROGRESS NOTES
CHIEF COMPLAINT:   Patient presents with:  Conjunctivitis: R side/ x3 days/ discharge at am/ itchiness/ swollen. HPI:   Wild Marshall is a 46year old female who presents with chief complaint of eye irritation. Symptoms began  4  days ago.  Symptoms daily. Disp: 45 g Rfl: 3   Fluocinolone Acetonide (DERMA-SMOOTHE/FS SCALP) 0.01 % External Oil Apply to scalp q hs Disp: 1 Bottle Rfl: 3   Cetirizine HCl (ZYRTEC ALLERGY OR) Take by mouth daily.  Disp:  Rfl:    Respiratory Therapy Supplies (NEBULIZER/TUBING pain  NEURO: denies headaches     EXAM:   /84 (BP Location: Left arm, Patient Position: Sitting, Cuff Size: adult)   Pulse 84   Temp 98.7 °F (37.1 °C) (Oral)   Resp 13   Ht 62\"   Wt 186 lb   SpO2 98%   BMI 34.02 kg/m²   GENERAL: well developed, well Dispense: 1 Bottle; Refill: 0    2. Acute sinusitis, recurrence not specified, unspecified location  Increase fluid intake, humidify air, have warm tea with honey  - Amoxicillin-Pot Clavulanate (AUGMENTIN) 875-125 MG Oral Tab;  Take 1 tablet by mouth 2 (two

## 2019-02-22 ENCOUNTER — TELEPHONE (OUTPATIENT)
Dept: INTERNAL MEDICINE CLINIC | Facility: CLINIC | Age: 49
End: 2019-02-22

## 2019-02-22 RX ORDER — CLINDAMYCIN HYDROCHLORIDE 150 MG/1
150 CAPSULE ORAL 3 TIMES DAILY
Qty: 30 CAPSULE | Refills: 0 | Status: SHIPPED | OUTPATIENT
Start: 2019-02-22 | End: 2019-03-04

## 2019-02-22 NOTE — TELEPHONE ENCOUNTER
Pt calling with update took abx for 10 days was getting better for the first 6 days then no improvement .  R eye is swollen again but the lower lid same eye , Both ears hurt again R>L

## 2019-02-22 NOTE — TELEPHONE ENCOUNTER
Pt was seen 2wks ago and was put on antibiotics and was asked to call back if symptoms do not get better and have not would like a [place] [facility]

## 2019-03-27 RX ORDER — LISINOPRIL 20 MG/1
TABLET ORAL
Qty: 90 TABLET | Refills: 0 | Status: SHIPPED | OUTPATIENT
Start: 2019-03-27 | End: 2019-06-25

## 2019-05-07 ENCOUNTER — TELEPHONE (OUTPATIENT)
Dept: INTERNAL MEDICINE CLINIC | Facility: CLINIC | Age: 49
End: 2019-05-07

## 2019-05-24 ENCOUNTER — LAB ENCOUNTER (OUTPATIENT)
Dept: LAB | Facility: HOSPITAL | Age: 49
End: 2019-05-24
Attending: INTERNAL MEDICINE
Payer: COMMERCIAL

## 2019-05-24 DIAGNOSIS — K50.10 CROHN'S DISEASE OF COLON WITHOUT COMPLICATION (HCC): ICD-10-CM

## 2019-05-24 PROCEDURE — 80076 HEPATIC FUNCTION PANEL: CPT

## 2019-05-24 PROCEDURE — 36415 COLL VENOUS BLD VENIPUNCTURE: CPT

## 2019-05-24 PROCEDURE — 80299 QUANTITATIVE ASSAY DRUG: CPT

## 2019-05-24 PROCEDURE — 85025 COMPLETE CBC W/AUTO DIFF WBC: CPT

## 2019-06-12 ENCOUNTER — OFFICE VISIT (OUTPATIENT)
Dept: INTERNAL MEDICINE CLINIC | Facility: CLINIC | Age: 49
End: 2019-06-12

## 2019-06-12 VITALS
DIASTOLIC BLOOD PRESSURE: 88 MMHG | RESPIRATION RATE: 20 BRPM | OXYGEN SATURATION: 98 % | SYSTOLIC BLOOD PRESSURE: 112 MMHG | HEART RATE: 81 BPM | TEMPERATURE: 99 F

## 2019-06-12 DIAGNOSIS — Z91.09 ENVIRONMENTAL ALLERGIES: Primary | ICD-10-CM

## 2019-06-12 DIAGNOSIS — L08.9 PUSTULE: ICD-10-CM

## 2019-06-12 PROCEDURE — 99213 OFFICE O/P EST LOW 20 MIN: CPT | Performed by: FAMILY MEDICINE

## 2019-06-12 RX ORDER — KETOROLAC TROMETHAMINE 4 MG/ML
1 SOLUTION/ DROPS OPHTHALMIC 4 TIMES DAILY
Qty: 5 ML | Refills: 1 | Status: SHIPPED | OUTPATIENT
Start: 2019-06-12 | End: 2020-02-07 | Stop reason: ALTCHOICE

## 2019-06-12 NOTE — PROGRESS NOTES
HPI:    Patient ID: Fabiano Doss is a 46year old female.     HPI  Here to ck lump on the back    Worried about cancer    Had it lanced in the past by derm    Seems in the same area   No DC  No pain  w it     Also has eyes that are red itchy   BL  No DC Conjunctivae and EOM are normal. Right eye exhibits no discharge. Left eye exhibits no discharge. No scleral icterus. Lymphadenopathy:     She has no cervical adenopathy. Neurological: She is alert.    Skin:   1cm pustule on upper R back w dry pus in th

## 2019-06-26 RX ORDER — LISINOPRIL 20 MG/1
20 TABLET ORAL
Qty: 30 TABLET | Refills: 0 | Status: SHIPPED | OUTPATIENT
Start: 2019-06-26 | End: 2019-07-24

## 2019-06-26 NOTE — TELEPHONE ENCOUNTER
MILADYS and sent [employer] Munch message for patient to call office and make appointment.  She is due for a BP follow up and labwork      Last filled 3/27/19 #90    Last labs 5/25/17     LOV  9/12/18

## 2019-07-02 ENCOUNTER — TELEPHONE (OUTPATIENT)
Dept: INTERNAL MEDICINE CLINIC | Facility: CLINIC | Age: 49
End: 2019-07-02

## 2019-07-02 NOTE — TELEPHONE ENCOUNTER
Left voicemail for patient to call office and make appointment for CPX and pap.      Please make appointment for July

## 2019-07-22 ENCOUNTER — OFFICE VISIT (OUTPATIENT)
Dept: INTERNAL MEDICINE CLINIC | Facility: CLINIC | Age: 49
End: 2019-07-22

## 2019-07-22 VITALS
WEIGHT: 189 LBS | SYSTOLIC BLOOD PRESSURE: 124 MMHG | HEIGHT: 62 IN | DIASTOLIC BLOOD PRESSURE: 76 MMHG | TEMPERATURE: 99 F | BODY MASS INDEX: 34.78 KG/M2

## 2019-07-22 DIAGNOSIS — Z00.00 ROUTINE GENERAL MEDICAL EXAMINATION AT A HEALTH CARE FACILITY: Primary | ICD-10-CM

## 2019-07-22 DIAGNOSIS — H69.81 ACUTE DYSFUNCTION OF RIGHT EUSTACHIAN TUBE: ICD-10-CM

## 2019-07-22 DIAGNOSIS — H10.13 ALLERGIC CONJUNCTIVITIS OF BOTH EYES: ICD-10-CM

## 2019-07-22 DIAGNOSIS — Z12.31 ENCOUNTER FOR SCREENING MAMMOGRAM FOR BREAST CANCER: ICD-10-CM

## 2019-07-22 DIAGNOSIS — Z00.00 LABORATORY EXAMINATION ORDERED AS PART OF A ROUTINE GENERAL MEDICAL EXAMINATION: ICD-10-CM

## 2019-07-22 PROCEDURE — 99213 OFFICE O/P EST LOW 20 MIN: CPT | Performed by: FAMILY MEDICINE

## 2019-07-22 PROCEDURE — 99396 PREV VISIT EST AGE 40-64: CPT | Performed by: FAMILY MEDICINE

## 2019-07-22 RX ORDER — OLOPATADINE HYDROCHLORIDE 1 MG/ML
1 SOLUTION/ DROPS OPHTHALMIC 2 TIMES DAILY
Qty: 1 BOTTLE | Refills: 0 | Status: SHIPPED | OUTPATIENT
Start: 2019-07-22 | End: 2020-02-07 | Stop reason: ALTCHOICE

## 2019-07-22 RX ORDER — METHYLPREDNISOLONE 4 MG/1
TABLET ORAL
Qty: 1 KIT | Refills: 0 | Status: SHIPPED | OUTPATIENT
Start: 2019-07-22 | End: 2020-02-07 | Stop reason: ALTCHOICE

## 2019-07-22 NOTE — PROGRESS NOTES
HPI:   Aria Murray is a 46year old female who presents for a complete physical exam. Symptoms: denies discharge, itching, burning or dysuria, is menopausal. Patient complains of bilateral itchy eyes  For months.  Pt tried the Ketorolac drops and it did 05/25/2017    LDL 78 04/15/2011     Lab Results   Component Value Date    AST 28 05/24/2019    AST 19 04/19/2018    AST 24 01/08/2018     Lab Results   Component Value Date    ALT 48 05/24/2019    ALT 35 04/19/2018    ALT 36 01/08/2018         Current Outp HISTORY  2000/2015    laparoscopic sigmoid colon resection x2   • REDUCTION LEFT     • REDUCTION OF LARGE BREAST  2005   • REDUCTION RIGHT     • REMOVE TONSILS/ADENOIDS,<11 Y/O     • UNLISTED PROC, HYSTEROSCOPY, UTERUS      w/ resection for intrauterine po auscultation  CARDIO: RRR without murmur  GI: good BS's,no masses, HSM or tenderness  :sees gyne  MUSCULOSKELETAL: back is not tender,FROM of the back  EXTREMITIES: no cyanosis, clubbing or edema  NEURO: Oriented times three,cranial nerves are intact,mot daily.  Dispense: 1 Bottle;  Refill: 0

## 2019-07-25 RX ORDER — LISINOPRIL 20 MG/1
TABLET ORAL
Qty: 90 TABLET | Refills: 0 | Status: SHIPPED | OUTPATIENT
Start: 2019-07-25 | End: 2019-10-04

## 2019-07-25 NOTE — TELEPHONE ENCOUNTER
Last filled 6/26/19 #30    LOV  7/22/19    Labs ordered 7/22/19 - to be completed in future    Last CMP  4/19/18

## 2019-08-01 ENCOUNTER — TELEPHONE (OUTPATIENT)
Dept: INTERNAL MEDICINE CLINIC | Facility: CLINIC | Age: 49
End: 2019-08-01

## 2019-08-01 RX ORDER — PREDNISONE 20 MG/1
40 TABLET ORAL DAILY
Qty: 10 TABLET | Refills: 0 | Status: SHIPPED | OUTPATIENT
Start: 2019-08-01 | End: 2019-08-06

## 2019-08-01 RX ORDER — AMOXICILLIN 875 MG/1
875 TABLET, COATED ORAL 2 TIMES DAILY
Qty: 20 TABLET | Refills: 0 | Status: SHIPPED | OUTPATIENT
Start: 2019-08-01 | End: 2019-08-11

## 2019-08-01 NOTE — TELEPHONE ENCOUNTER
Nikos Irwin told patient if her ear was not feeling better to callback; patient is still having ear pain please call to triage as needed.  Patient asking for callback if Gonzalez Mcghee orders another medication for her

## 2019-08-01 NOTE — TELEPHONE ENCOUNTER
Lets put her on Amoxil 875 mg one bid #20 and prednisone 40 mg x 5 days. Pt to f/u with ENT- Dr. Monica Bernard if not better.

## 2019-08-01 NOTE — TELEPHONE ENCOUNTER
While pt was on steroids it dulled the pain. After finishing medication pain returned to R ear 1-2 days

## 2019-08-09 ENCOUNTER — TELEPHONE (OUTPATIENT)
Dept: INTERNAL MEDICINE CLINIC | Facility: CLINIC | Age: 49
End: 2019-08-09

## 2019-08-09 DIAGNOSIS — H92.01 ACUTE PAIN OF RIGHT EAR: Primary | ICD-10-CM

## 2019-08-09 NOTE — TELEPHONE ENCOUNTER
Pt informed and referral done for Dr Jose Ly 3:39 PM   VIRGINIA Kumari routed this conversation to [facility]   VIRGINIA Kumari          3:35 PM   Note      Lets put her on Amoxil 875 mg one bid #20 and prednisone 40 mg x 5 days.  Pt t

## 2019-08-09 NOTE — TELEPHONE ENCOUNTER
Patient was given an antibiotic on 08/01/19 and a steroid. She was told to call back if she doesn't feel any better. She would like to speak with a nurse. Please advise.

## 2019-08-20 ENCOUNTER — TELEPHONE (OUTPATIENT)
Dept: INTERNAL MEDICINE CLINIC | Facility: CLINIC | Age: 49
End: 2019-08-20

## 2019-08-20 DIAGNOSIS — M26.609 TEMPOROMANDIBULAR JOINT DISORDER (TMJ): Primary | ICD-10-CM

## 2019-08-20 DIAGNOSIS — H69.81 ACUTE DYSFUNCTION OF RIGHT EUSTACHIAN TUBE: ICD-10-CM

## 2019-08-20 NOTE — TELEPHONE ENCOUNTER
Referral request   Received: Yesterday   Message Contents   Shala Ordo[facility]ez [facility] Clinical Staff   Cc: [facility]   Phone Number: [phone]             Patient Name: Dolores Manrique   : 1970   Reason for the order/refer

## 2019-08-21 NOTE — TELEPHONE ENCOUNTER
Spoke with pt who saw SM x2 who then referred her to ENT Dr Menjivar Ka   Dr Marek Becker was seen last week and he thinks she may have TMJ and recommended she see oral surgeon which she needs referral

## 2019-08-26 NOTE — TELEPHONE ENCOUNTER
Pt called back tried to schedule appt with Dr Catherine Chapa and they will not schedule with him till a dx of TMJ has been confirmed with testing and seeing Dr Delia Watson at the [facility] center for sleep disorders and facial pain

## 2019-08-26 NOTE — TELEPHONE ENCOUNTER
We need to issue new referral for TMJ Dr Rene Amaya only does surgery after all options exhausted,testing     Ok to refer to chari?

## 2019-08-28 NOTE — TELEPHONE ENCOUNTER
Message   Received: Yesterday   Message Contents   Richi Matamoros [facility] Clinical Staff; [facility] Referral Pool             Hello,     This provider is out of network.  Member should be utilizing in network providers.   Please be advised and re

## 2019-08-28 NOTE — TELEPHONE ENCOUNTER
These physcians in network ok to refer?       [8/28/2019 3:47 PM] Suman Malave:   ODALYS * SNEHA DDS  Luan Calvert DDS   [8/28/2019 3:47 PM] Suman Malave:   DENICE * NAOMI DDS   PIERRE NICHOLS DDS   VITALIY * MARY JANE DDS   Thomas Finnegan DDS

## 2019-08-30 PROBLEM — K59.00 CONSTIPATION, UNSPECIFIED CONSTIPATION TYPE: Status: ACTIVE | Noted: 2019-08-30

## 2019-08-30 PROCEDURE — 83993 ASSAY FOR CALPROTECTIN FECAL: CPT | Performed by: INTERNAL MEDICINE

## 2019-09-18 DIAGNOSIS — E55.9 VITAMIN D DEFICIENCY: Primary | ICD-10-CM

## 2019-09-18 DIAGNOSIS — E55.9 VITAMIN D DEFICIENCY: ICD-10-CM

## 2019-09-18 DIAGNOSIS — R74.8 ABNORMAL LIVER ENZYMES: ICD-10-CM

## 2019-09-18 DIAGNOSIS — E78.5 HYPERLIPIDEMIA, UNSPECIFIED HYPERLIPIDEMIA TYPE: ICD-10-CM

## 2019-09-18 RX ORDER — ERGOCALCIFEROL 1.25 MG/1
50000 CAPSULE ORAL WEEKLY
Qty: 4 CAPSULE | Refills: 0 | Status: SHIPPED | OUTPATIENT
Start: 2019-09-18 | End: 2019-10-18

## 2019-09-19 RX ORDER — ERGOCALCIFEROL 1.25 MG/1
CAPSULE ORAL
Qty: 12 CAPSULE | Refills: 0 | OUTPATIENT
Start: 2019-09-19

## 2019-09-19 NOTE — TELEPHONE ENCOUNTER
Ergocalciferol 50,000 To early for refill  Last OV relevant to medication: 7-22-19  Last refill date: 9-18-19  #/refills: 0  When pt was asked to return for OV: CPX 1 yr  Upcoming appt/reason: none  Recent labs: 8-30-19: Vitamin D

## 2019-10-04 RX ORDER — LISINOPRIL 20 MG/1
TABLET ORAL
Qty: 90 TABLET | Refills: 0 | Status: SHIPPED | OUTPATIENT
Start: 2019-10-04 | End: 2020-02-07

## 2019-10-04 NOTE — TELEPHONE ENCOUNTER
LISINOPRIL 20 MG Oral Tab    Passed Protocol    Last OV relevant to medication: 7/22/2019    Last refill date: 7/25/2019     #/refills: #90 w/ 0 refills     When pt was asked to return for OV: 1 year    Upcoming appt/reason: No future appointments     Lab

## 2019-12-27 ENCOUNTER — HOSPITAL ENCOUNTER (EMERGENCY)
Age: 49
Discharge: HOME | End: 2019-12-27
Attending: EMERGENCY MEDICINE
Payer: COMMERCIAL

## 2019-12-27 VITALS
DIASTOLIC BLOOD PRESSURE: 89 MMHG | OXYGEN SATURATION: 97 % | HEART RATE: 76 BPM | RESPIRATION RATE: 16 BRPM | TEMPERATURE: 97 F | BODY MASS INDEX: 34 KG/M2 | WEIGHT: 188 LBS | SYSTOLIC BLOOD PRESSURE: 140 MMHG

## 2019-12-27 DIAGNOSIS — S05.01XA ABRASION OF RIGHT CORNEA, INITIAL ENCOUNTER: Primary | ICD-10-CM

## 2019-12-27 PROCEDURE — 99283 EMERGENCY DEPT VISIT LOW MDM: CPT

## 2019-12-27 RX ORDER — HYDROCODONE BITARTRATE AND ACETAMINOPHEN 5; 325 MG/1; MG/1
1-2 TABLET ORAL EVERY 6 HOURS PRN
Qty: 10 TABLET | Refills: 0 | Status: SHIPPED | OUTPATIENT
Start: 2019-12-27 | End: 2020-01-03

## 2019-12-27 RX ORDER — CIPROFLOXACIN HYDROCHLORIDE 3.5 MG/ML
2 SOLUTION/ DROPS TOPICAL
Qty: 1 BOTTLE | Refills: 0 | Status: SHIPPED | OUTPATIENT
Start: 2019-12-27 | End: 2020-01-01

## 2019-12-27 RX ORDER — TETRACAINE HYDROCHLORIDE 5 MG/ML
2 SOLUTION OPHTHALMIC ONCE
Status: COMPLETED | OUTPATIENT
Start: 2019-12-27 | End: 2019-12-27

## 2019-12-28 NOTE — ED PROVIDER NOTES
Patient Seen in: [facility] In [place]      History   Patient presents with:   Eye Visual Problem    Stated Complaint: right eye pain after removing contact this evening    HPI    Patient is a 12-year-old female who got new contact jennifer 2048]   /89   Pulse 76   Resp 16   Temp 97 °F (36.1 °C)   Temp src Oral   SpO2 97 %   O2 Device None (Room air)       Current:/89   Pulse 76   Temp 97 °F (36.1 °C) (Oral)   Resp 16   Wt 85.3 kg   SpO2 97%   BMI 34.39 kg/m²     Right Eye Chart A every 6 (six) hours as needed for Pain., Sha Disp-10 tablet, R-0    ciprofloxacin HCl 0.3 % Ophthalmic Solution  Place 2 drops into the right eye 4 (four) times daily for 5 days. , Sha Disp-1 Bottle, R-0

## 2020-02-07 ENCOUNTER — OFFICE VISIT (OUTPATIENT)
Dept: INTERNAL MEDICINE CLINIC | Facility: CLINIC | Age: 50
End: 2020-02-07

## 2020-02-07 ENCOUNTER — HOSPITAL ENCOUNTER (OUTPATIENT)
Dept: GENERAL RADIOLOGY | Age: 50
Discharge: HOME | End: 2020-02-07
Attending: FAMILY MEDICINE
Payer: COMMERCIAL

## 2020-02-07 VITALS
BODY MASS INDEX: 35 KG/M2 | OXYGEN SATURATION: 98 % | WEIGHT: 193 LBS | RESPIRATION RATE: 16 BRPM | HEART RATE: 90 BPM | DIASTOLIC BLOOD PRESSURE: 80 MMHG | SYSTOLIC BLOOD PRESSURE: 118 MMHG | TEMPERATURE: 98 F

## 2020-02-07 DIAGNOSIS — R05.9 COUGH: ICD-10-CM

## 2020-02-07 DIAGNOSIS — R05.9 COUGH: Primary | ICD-10-CM

## 2020-02-07 DIAGNOSIS — I10 ESSENTIAL HYPERTENSION, BENIGN: ICD-10-CM

## 2020-02-07 PROCEDURE — 71046 X-RAY EXAM CHEST 2 VIEWS: CPT | Performed by: FAMILY MEDICINE

## 2020-02-07 PROCEDURE — 99214 OFFICE O/P EST MOD 30 MIN: CPT | Performed by: FAMILY MEDICINE

## 2020-02-07 RX ORDER — ALBUTEROL SULFATE 90 UG/1
2 AEROSOL, METERED RESPIRATORY (INHALATION) EVERY 4 HOURS PRN
Qty: 6.7 G | Refills: 2 | Status: SHIPPED | OUTPATIENT
Start: 2020-02-07 | End: 2021-05-22

## 2020-02-07 RX ORDER — LISINOPRIL 20 MG/1
20 TABLET ORAL
Qty: 90 TABLET | Refills: 0 | Status: SHIPPED | OUTPATIENT
Start: 2020-02-07 | End: 2020-05-06

## 2020-02-07 RX ORDER — CEFDINIR 300 MG/1
300 CAPSULE ORAL 2 TIMES DAILY
Qty: 20 CAPSULE | Refills: 0 | Status: SHIPPED | OUTPATIENT
Start: 2020-02-07 | End: 2020-08-13

## 2020-02-07 RX ORDER — ALBUTEROL SULFATE 90 UG/1
2 AEROSOL, METERED RESPIRATORY (INHALATION) EVERY 4 HOURS PRN
Qty: 6.7 G | Refills: 2 | Status: CANCELLED | OUTPATIENT
Start: 2020-02-07

## 2020-02-07 NOTE — PROGRESS NOTES
HPI:    Patient ID: Debbie Maradiaga is a 44year old female. HPI  HPI:   Debbie Maradiaga is a 44year old female who presents for upper respiratory symptoms for  5  days. Patient reports URI s/s.   Had ST  Some chills  Felling L sided chest soreness w de 5/19/2014    Performed by Lottie Pickett MD at [facility] MAIN OR   • OTHER SURGICAL HISTORY  2000/2015    laparoscopic sigmoid colon resection x2   • REDUCTION LEFT     • REDUCTION OF LARGE BREAST  2005   • REDUCTION RIGHT     • REMOVE TONSILS/ADENOIDS,<13 Y/O MCG/ACT Inhalation Aero Soln Inhale 2 puffs into the lungs every 4 (four) hours as needed.  For wheezing 6.7 g 2   • Halobetasol Propionate 0.05 % External Ointment Apply a thin layer bid to trunk ,both arms and both legs 100 g 5   • Betamethasone Dipropion

## 2020-05-06 RX ORDER — LISINOPRIL 20 MG/1
TABLET ORAL
Qty: 90 TABLET | Refills: 0 | Status: SHIPPED
Start: 2020-05-06 | End: 2020-05-08

## 2020-05-08 RX ORDER — LISINOPRIL 20 MG/1
TABLET ORAL
Qty: 90 TABLET | Refills: 0 | Status: SHIPPED | OUTPATIENT
Start: 2020-05-08 | End: 2020-08-12

## 2020-08-08 ENCOUNTER — LAB ENCOUNTER (OUTPATIENT)
Dept: LAB | Age: 50
End: 2020-08-08
Attending: INTERNAL MEDICINE
Payer: COMMERCIAL

## 2020-08-08 DIAGNOSIS — K50.10 CROHN'S DISEASE OF COLON WITHOUT COMPLICATION (HCC): ICD-10-CM

## 2020-08-08 DIAGNOSIS — R74.8 ELEVATED LIVER ENZYMES: ICD-10-CM

## 2020-08-08 DIAGNOSIS — R74.8 ABNORMAL LIVER ENZYMES: ICD-10-CM

## 2020-08-08 DIAGNOSIS — E55.9 VITAMIN D DEFICIENCY: ICD-10-CM

## 2020-08-08 LAB
ALBUMIN SERPL-MCNC: 3.8 G/DL (ref 3.4–5)
ALP LIVER SERPL-CCNC: 162 U/L (ref 41–108)
ALT SERPL-CCNC: 31 U/L (ref 13–56)
AST SERPL-CCNC: 17 U/L (ref 15–37)
BASOPHILS # BLD AUTO: 0.08 X10(3) UL (ref 0–0.2)
BASOPHILS NFR BLD AUTO: 0.8 %
BILIRUB DIRECT SERPL-MCNC: <0.1 MG/DL (ref 0–0.2)
BILIRUB SERPL-MCNC: 0.3 MG/DL (ref 0.1–2)
DEPRECATED RDW RBC AUTO: 43.9 FL (ref 35.1–46.3)
EOSINOPHIL # BLD AUTO: 0.95 X10(3) UL (ref 0–0.7)
EOSINOPHIL NFR BLD AUTO: 10 %
ERYTHROCYTE [DISTWIDTH] IN BLOOD BY AUTOMATED COUNT: 11.7 % (ref 11–15)
HCT VFR BLD AUTO: 41.5 % (ref 35–48)
HGB BLD-MCNC: 13.1 G/DL (ref 12–16)
IMM GRANULOCYTES # BLD AUTO: 0.06 X10(3) UL (ref 0–1)
IMM GRANULOCYTES NFR BLD: 0.6 %
LYMPHOCYTES # BLD AUTO: 3.63 X10(3) UL (ref 1–4)
LYMPHOCYTES NFR BLD AUTO: 38.2 %
M PROTEIN MFR SERPL ELPH: 8.3 G/DL (ref 6.4–8.2)
MCH RBC QN AUTO: 32.2 PG (ref 26–34)
MCHC RBC AUTO-ENTMCNC: 31.6 G/DL (ref 31–37)
MCV RBC AUTO: 102 FL (ref 80–100)
MONOCYTES # BLD AUTO: 0.5 X10(3) UL (ref 0.1–1)
MONOCYTES NFR BLD AUTO: 5.3 %
NEUTROPHILS # BLD AUTO: 4.29 X10 (3) UL (ref 1.5–7.7)
NEUTROPHILS # BLD AUTO: 4.29 X10(3) UL (ref 1.5–7.7)
NEUTROPHILS NFR BLD AUTO: 45.1 %
PLATELET # BLD AUTO: 372 10(3)UL (ref 150–450)
RBC # BLD AUTO: 4.07 X10(6)UL (ref 3.8–5.3)
VIT D+METAB SERPL-MCNC: 29.5 NG/ML (ref 30–100)
WBC # BLD AUTO: 9.5 X10(3) UL (ref 4–11)

## 2020-08-08 PROCEDURE — 36415 COLL VENOUS BLD VENIPUNCTURE: CPT

## 2020-08-08 PROCEDURE — 80076 HEPATIC FUNCTION PANEL: CPT

## 2020-08-08 PROCEDURE — 85025 COMPLETE CBC W/AUTO DIFF WBC: CPT

## 2020-08-08 PROCEDURE — 82306 VITAMIN D 25 HYDROXY: CPT

## 2020-08-12 RX ORDER — LISINOPRIL 20 MG/1
TABLET ORAL
Qty: 90 TABLET | Refills: 0 | Status: SHIPPED | OUTPATIENT
Start: 2020-08-12 | End: 2020-11-02

## 2020-08-12 NOTE — TELEPHONE ENCOUNTER
Called pt to schedule appt for cpx   Pt states that she does not want to do a physical but will come in for a medication/bp follow up   Scheduled appt with Hugh Shearer on 8/13/2020   Pt states that she is currently out of medication     LISINOPRIL 20MG TABLETS

## 2020-08-13 ENCOUNTER — OFFICE VISIT (OUTPATIENT)
Dept: INTERNAL MEDICINE CLINIC | Facility: CLINIC | Age: 50
End: 2020-08-13

## 2020-08-13 VITALS
BODY MASS INDEX: 34.67 KG/M2 | DIASTOLIC BLOOD PRESSURE: 94 MMHG | RESPIRATION RATE: 16 BRPM | OXYGEN SATURATION: 98 % | TEMPERATURE: 98 F | SYSTOLIC BLOOD PRESSURE: 142 MMHG | HEIGHT: 62 IN | WEIGHT: 188.38 LBS | HEART RATE: 103 BPM

## 2020-08-13 DIAGNOSIS — I10 ESSENTIAL HYPERTENSION, BENIGN: Primary | ICD-10-CM

## 2020-08-13 DIAGNOSIS — Z12.31 SCREENING MAMMOGRAM, ENCOUNTER FOR: ICD-10-CM

## 2020-08-13 DIAGNOSIS — Z91.09 ENVIRONMENTAL ALLERGIES: ICD-10-CM

## 2020-08-13 PROCEDURE — 3080F DIAST BP >= 90 MM HG: CPT | Performed by: NURSE PRACTITIONER

## 2020-08-13 PROCEDURE — 99213 OFFICE O/P EST LOW 20 MIN: CPT | Performed by: NURSE PRACTITIONER

## 2020-08-13 PROCEDURE — 3077F SYST BP >= 140 MM HG: CPT | Performed by: NURSE PRACTITIONER

## 2020-08-13 PROCEDURE — 3008F BODY MASS INDEX DOCD: CPT | Performed by: NURSE PRACTITIONER

## 2020-08-13 RX ORDER — CARBINOXAMINE MALEATE 6 MG/1
1 TABLET ORAL 3 TIMES DAILY
Qty: 60 TABLET | Refills: 1 | Status: SHIPPED | OUTPATIENT
Start: 2020-08-13 | End: 2020-09-02

## 2020-08-13 NOTE — PROGRESS NOTES
CHIEF COMPLAINT:     Patient presents with:  Hypertension      HPI:   Debbie Maradiaga is a 44year old female patient presents for recheck of her hypertension.  Pt has been taking medications as instructed, no medication side effects, home BP monitoring not History:  Social History    Tobacco Use      Smoking status: Never Smoker      Smokeless tobacco: Never Used    Alcohol use: Not Currently    Drug use: No       REVIEW OF SYSTEMS:   GENERAL: Denies fever, chills, weight change, decreased appetite  SKIN: Sybil Caal

## 2020-08-18 ENCOUNTER — TELEPHONE (OUTPATIENT)
Dept: INTERNAL MEDICINE CLINIC | Facility: CLINIC | Age: 50
End: 2020-08-18

## 2020-08-18 DIAGNOSIS — Z91.09 ENVIRONMENTAL ALLERGIES: ICD-10-CM

## 2020-08-18 NOTE — TELEPHONE ENCOUNTER
Ryvent 6 Mg is not covered through insurance. PA required     Submitted PA on cover my meds. Key: [id number]    Your information has been submitted to [employer]. [employer] is reviewing the PA request and you will receive an electronic response.  You m

## 2020-08-19 NOTE — TELEPHONE ENCOUNTER
Prior Authorization was denied     Pt does not meet criteria due to the following:   - pt must have tried and failed two formulary alternative drugs       Denial note placed in accordion

## 2020-08-24 ENCOUNTER — TELEPHONE (OUTPATIENT)
Dept: INTERNAL MEDICINE CLINIC | Facility: CLINIC | Age: 50
End: 2020-08-24

## 2020-08-24 DIAGNOSIS — R74.8 ELEVATED LIVER ENZYMES: ICD-10-CM

## 2020-08-24 DIAGNOSIS — E55.9 VITAMIN D DEFICIENCY: Primary | ICD-10-CM

## 2020-08-24 RX ORDER — ERGOCALCIFEROL 1.25 MG/1
50000 CAPSULE ORAL WEEKLY
Qty: 12 CAPSULE | Refills: 1 | Status: SHIPPED | OUTPATIENT
Start: 2020-08-24 | End: 2021-11-29 | Stop reason: ALTCHOICE

## 2020-08-24 NOTE — TELEPHONE ENCOUNTER
Lidia Matthews, APRDONNA  8/9/2020  3:25 PM      Patient has Vit D deficiency. Pt needs vit D 78173 IU once a week for 6 months, after 2000 IU daily!!!. Recheck vit D in 6 months. Liver enzymes better, I will continue to monitor these. Recheck in 6 months.

## 2020-08-26 NOTE — TELEPHONE ENCOUNTER
PA resubmitted via [facility]    Key: [id number]    Your information has been submitted to [employer]. [employer] is reviewing the PA request and you will receive an electronic response.  You may check for the updated outcome later by reopening this request. The

## 2020-08-27 NOTE — TELEPHONE ENCOUNTER
Fax received from [employer]. Requesting more documentation.     Notes from [address] [place] [address] faxed     Awaiting response

## 2020-08-28 ENCOUNTER — PATIENT OUTREACH (OUTPATIENT)
Dept: INTERNAL MEDICINE CLINIC | Facility: CLINIC | Age: 50
End: 2020-08-28

## 2020-08-31 NOTE — PROGRESS NOTES
Spoke with patient and I advised her that she is due for her physical and PAP. Patient declined at this time.

## 2020-08-31 NOTE — TELEPHONE ENCOUNTER
Went on cover my meds to check outcome. Outcome was cancelled due to insurance coverage not being active.

## 2020-09-01 NOTE — TELEPHONE ENCOUNTER
Received a denial from [employer]. Pt must have tried and failed 2 formulary alternative drugs. - Script[facility] in [facility] is usually a cheaper cost to pt. About $15 for #60 tablets.      Sent pt [employer] message to see what she would like to

## 2020-09-02 RX ORDER — CARBINOXAMINE MALEATE 6 MG/1
1 TABLET ORAL 3 TIMES DAILY PRN
Qty: 90 TABLET | Refills: 2 | Status: SHIPPED | OUTPATIENT
Start: 2020-09-02 | End: 2021-03-31

## 2020-09-02 NOTE — TELEPHONE ENCOUNTER
Notified patient on denial from [email or web address]. Patient would like RYVENT to be sent to Yelena Hernandez in [facility]. Gave patient address, phone and fax #.

## 2020-09-11 ENCOUNTER — LAB ENCOUNTER (OUTPATIENT)
Dept: LAB | Facility: HOSPITAL | Age: 50
End: 2020-09-11
Attending: INTERNAL MEDICINE
Payer: COMMERCIAL

## 2020-09-11 DIAGNOSIS — K50.10 CROHN'S DISEASE OF LARGE INTESTINE WITHOUT COMPLICATION (HCC): ICD-10-CM

## 2020-09-11 PROCEDURE — 83993 ASSAY FOR CALPROTECTIN FECAL: CPT

## 2020-09-14 LAB — CALPROTECTIN STL-MCNT: 304 ΜG/G (ref ?–50)

## 2020-09-17 ENCOUNTER — TELEPHONE (OUTPATIENT)
Dept: INTERNAL MEDICINE CLINIC | Facility: CLINIC | Age: 50
End: 2020-09-17

## 2020-09-17 RX ORDER — CARBINOXAMINE MALEATE 4 MG/1
1 TABLET ORAL 3 TIMES DAILY PRN
Qty: 270 TABLET | Refills: 1 | Status: SHIPPED | OUTPATIENT
Start: 2020-09-17 | End: 2020-12-16

## 2020-09-17 NOTE — TELEPHONE ENCOUNTER
Covered formulary alternatives may include: Carbinoxamine Maleate 4 mg (PA not required). If you would like to complete a prior authorization for RYVENT 6 mg, please visit [facility]:    KEY: [id number]

## 2020-09-26 ENCOUNTER — TELEMEDICINE (OUTPATIENT)
Dept: INTERNAL MEDICINE CLINIC | Facility: CLINIC | Age: 50
End: 2020-09-26

## 2020-09-26 DIAGNOSIS — J45.21 MILD INTERMITTENT ASTHMA WITH ACUTE EXACERBATION: ICD-10-CM

## 2020-09-26 DIAGNOSIS — J32.0 LEFT MAXILLARY SINUSITIS: Primary | ICD-10-CM

## 2020-09-26 DIAGNOSIS — Z20.822 SUSPECTED COVID-19 VIRUS INFECTION: ICD-10-CM

## 2020-09-26 PROCEDURE — 99213 OFFICE O/P EST LOW 20 MIN: CPT | Performed by: FAMILY MEDICINE

## 2020-09-26 RX ORDER — PREDNISONE 10 MG/1
TABLET ORAL
Qty: 20 TABLET | Refills: 0 | Status: SHIPPED | OUTPATIENT
Start: 2020-09-26 | End: 2021-01-05 | Stop reason: ALTCHOICE

## 2020-09-26 RX ORDER — CEFDINIR 300 MG/1
300 CAPSULE ORAL 2 TIMES DAILY
Qty: 20 CAPSULE | Refills: 0 | Status: SHIPPED | OUTPATIENT
Start: 2020-09-26 | End: 2021-01-05 | Stop reason: ALTCHOICE

## 2020-09-26 NOTE — PROGRESS NOTES
Virtual Telephone Check-In    Tristonolga Pasquale verbally consents to a Virtual/VideoTelephone Check-In visit on 09/26/20. Patient has been referred to the [facility] website at [email or web address] to review the yearly Consent to Treat document.     Patient un Denies abdominal pain, N/V/C/D.   MUSCULOSKELETAL: Denies any arthralgia,myalgias or swollen joints  LYMPH:  Denies lymphadenopathy  NEURO:  + sinus headaches      PE:  Patient not seen in the office,appoint via Video telephone so no vital signs were obtai interactive audio and/or video communication.   This has been done in good vanesa to provide continuity of care in the best interest of the provider-patient relationship, due to the ongoing public health crisis/national emergency and because of restrictions

## 2020-10-26 ENCOUNTER — LAB ENCOUNTER (OUTPATIENT)
Dept: LAB | Age: 50
End: 2020-10-26
Attending: INTERNAL MEDICINE
Payer: COMMERCIAL

## 2020-10-26 DIAGNOSIS — K50.10 CROHN'S DISEASE OF LARGE INTESTINE WITHOUT COMPLICATION (HCC): ICD-10-CM

## 2020-10-26 PROCEDURE — 36415 COLL VENOUS BLD VENIPUNCTURE: CPT

## 2020-10-26 PROCEDURE — 80299 QUANTITATIVE ASSAY DRUG: CPT

## 2020-10-26 PROCEDURE — 82397 CHEMILUMINESCENT ASSAY: CPT

## 2020-11-02 ENCOUNTER — E-VISIT (OUTPATIENT)
Dept: TELEHEALTH | Age: 50
End: 2020-11-02

## 2020-11-02 DIAGNOSIS — H10.30 ACUTE BACTERIAL CONJUNCTIVITIS, UNSPECIFIED LATERALITY: Primary | ICD-10-CM

## 2020-11-02 PROCEDURE — 99421 OL DIG E/M SVC 5-10 MIN: CPT | Performed by: NURSE PRACTITIONER

## 2020-11-02 RX ORDER — OFLOXACIN 3 MG/ML
SOLUTION/ DROPS OPHTHALMIC
Qty: 1 BOTTLE | Refills: 0 | Status: SHIPPED | OUTPATIENT
Start: 2020-11-02 | End: 2021-03-31

## 2020-11-02 RX ORDER — LISINOPRIL 20 MG/1
20 TABLET ORAL DAILY
Qty: 90 TABLET | Refills: 0 | Status: SHIPPED | OUTPATIENT
Start: 2020-11-02 | End: 2021-01-28

## 2020-11-02 NOTE — PATIENT INSTRUCTIONS
Bacterial Conjunctivitis    You have an infection in the membranes covering the white part of the eye. This part of the eye is called the conjunctiva. The infection is called conjunctivitis.  The most common symptoms of conjunctivitis include a thick, pus educational content on 4/1/2020  © 8358-4639 [employer] [address]. [address] [facility] [address]. All rights reserved. This information is not intended as a substitute for professional medical care.  Always follow your healthcare profession

## 2020-11-02 NOTE — TELEPHONE ENCOUNTER
LOV: 9/26/2020 with Estrada Méndez NP  RTC: no follow-up on file  Last Relevant Labs: 8/8/2020   Filled: 8/12/2020  #90 with 0 refills    No future appointments.

## 2020-11-02 NOTE — PROGRESS NOTES
Ekaterina Camejo is a 44year old female. HPI:   See answers to questions above.      Current Outpatient Medications   Medication Sig Dispense Refill   • ofloxacin 0.3 % Ophthalmic Solution Instill one drop into the affected eye(s) every 2-4 hours for 2 d [facility])    • Essential hypertension    • Extrinsic asthma, unspecified    • Sinusitis 9/16/2013      Past Surgical History:   Procedure Laterality Date   • COLONOSCOPY  1/13 9/14    armando   • COLONOSCOPY,POSSIBLE BIOPSY,POSSIBLE POLYPECTOMY N/A 1/29/2018

## 2020-11-11 ENCOUNTER — E-VISIT (OUTPATIENT)
Dept: TELEHEALTH | Age: 50
End: 2020-11-11

## 2020-11-11 DIAGNOSIS — Z02.9 ADMINISTRATIVE ENCOUNTER: Primary | ICD-10-CM

## 2020-11-11 NOTE — PROGRESS NOTES
Patient requested an E-Visit. After failure of eye drop, referred to provider for physical evaluation. No Chargers assessed for this visit.

## 2020-11-12 ENCOUNTER — TELEPHONE (OUTPATIENT)
Dept: INTERNAL MEDICINE CLINIC | Facility: CLINIC | Age: 50
End: 2020-11-12

## 2020-11-12 ENCOUNTER — HOSPITAL ENCOUNTER (OUTPATIENT)
Age: 50
Discharge: HOME | End: 2020-11-12
Attending: EMERGENCY MEDICINE
Payer: COMMERCIAL

## 2020-11-12 VITALS
TEMPERATURE: 98 F | RESPIRATION RATE: 16 BRPM | DIASTOLIC BLOOD PRESSURE: 92 MMHG | WEIGHT: 183 LBS | BODY MASS INDEX: 33.68 KG/M2 | HEIGHT: 62 IN | SYSTOLIC BLOOD PRESSURE: 120 MMHG | OXYGEN SATURATION: 98 % | HEART RATE: 85 BPM

## 2020-11-12 DIAGNOSIS — H10.9 CONJUNCTIVITIS, UNSPECIFIED CONJUNCTIVITIS TYPE, UNSPECIFIED LATERALITY: Primary | ICD-10-CM

## 2020-11-12 DIAGNOSIS — H10.32 ACUTE CONJUNCTIVITIS OF LEFT EYE, UNSPECIFIED ACUTE CONJUNCTIVITIS TYPE: Primary | ICD-10-CM

## 2020-11-12 PROCEDURE — 99212 OFFICE O/P EST SF 10 MIN: CPT

## 2020-11-12 NOTE — ED PROVIDER NOTES
Patient Seen in: [facility]      History   Patient presents with: Eye Visual Problem    Stated Complaint: left eye issue    HPI    49-year-old female who presents here to the immediate care complaining of having left eye irritation.   She Currently    Drug use: No             Review of Systems    Positive for stated complaint: left eye issue  Other systems are as noted in HPI. Constitutional and vital signs reviewed. All other systems reviewed and negative except as noted above.     Ph encounter diagnosis)    Disposition:  Discharge  11/12/2020  8:31 am    Follow-up:  Keith Franklin MD  [address]  [phone]    Schedule an appointment as soon as possible for a visit in 2 days      Kolby Dillard MD

## 2020-11-12 NOTE — TELEPHONE ENCOUNTER
Referral pending if appropriate. No Dr. Clif Ewing. Spoke with patient and confirmed it should be for Dr. Ale Godinez.     .Reason for the order/referral:OPHTHALMOLOGY/CONSULT   PCP:MAURILIO   Refer to Provider Katelyn Louise

## 2020-11-12 NOTE — ED INITIAL ASSESSMENT (HPI)
About 9 days ago, pt was treated for left eye conjunctivitis with Ofloxacin. Then on Monday, symptoms returned and still c/o left eye swelling, itchiness and discharge.

## 2020-12-23 ENCOUNTER — TELEMEDICINE (OUTPATIENT)
Dept: INTERNAL MEDICINE CLINIC | Facility: CLINIC | Age: 50
End: 2020-12-23

## 2020-12-23 DIAGNOSIS — J06.9 UPPER RESPIRATORY TRACT INFECTION, UNSPECIFIED TYPE: ICD-10-CM

## 2020-12-23 DIAGNOSIS — Z20.822 ENCOUNTER BY TELEHEALTH FOR SUSPECTED COVID-19: Primary | ICD-10-CM

## 2020-12-23 PROCEDURE — 99213 OFFICE O/P EST LOW 20 MIN: CPT | Performed by: NURSE PRACTITIONER

## 2020-12-23 RX ORDER — AMOXICILLIN AND CLAVULANATE POTASSIUM 875; 125 MG/1; MG/1
1 TABLET, FILM COATED ORAL 2 TIMES DAILY
Qty: 14 TABLET | Refills: 0 | Status: SHIPPED | OUTPATIENT
Start: 2020-12-23 | End: 2020-12-30

## 2020-12-23 NOTE — PROGRESS NOTES
Virtual Telephone Check-In    Jadyn Bentley verbally consents to a Virtual/Telephone Check-In visit on 12/23/20. Patient verified identification with name and date of birth.      Patient understands and accepts financial responsibility for any deductible, ergocalciferol 1.25 MG (70402 UT) Oral Cap Take 1 capsule (50,000 Units total) by mouth once a week.  12 capsule 1   • Albuterol Sulfate HFA (PROAIR HFA) 108 (90 Base) MCG/ACT Inhalation Aero Soln Inhale 2 puffs into the lungs every 4 (four) hours as needed upcoming holiday if no improvement or worsening. Advised to not take decongestants due to HTN. - Amoxicillin-Pot Clavulanate 875-125 MG Oral Tab; Take 1 tablet by mouth 2 (two) times daily for 7 days. Dispense: 14 tablet;  Refill: 0          Please note

## 2020-12-24 ENCOUNTER — LAB ENCOUNTER (OUTPATIENT)
Dept: LAB | Age: 50
End: 2020-12-24
Attending: NURSE PRACTITIONER
Payer: COMMERCIAL

## 2020-12-24 DIAGNOSIS — Z20.822 ENCOUNTER BY TELEHEALTH FOR SUSPECTED COVID-19: ICD-10-CM

## 2021-01-05 ENCOUNTER — TELEMEDICINE (OUTPATIENT)
Dept: INTERNAL MEDICINE CLINIC | Facility: CLINIC | Age: 51
End: 2021-01-05

## 2021-01-05 DIAGNOSIS — J32.4 CHRONIC PANSINUSITIS: ICD-10-CM

## 2021-01-05 DIAGNOSIS — R07.9 CHEST PAIN, UNSPECIFIED TYPE: Primary | ICD-10-CM

## 2021-01-05 PROCEDURE — 99213 OFFICE O/P EST LOW 20 MIN: CPT | Performed by: NURSE PRACTITIONER

## 2021-01-05 NOTE — PROGRESS NOTES
Virtual Telephone Check-In    Jaja Park verbally consents to a Virtual/Telephone Check-In visit on 01/05/21. Patient verified identification with name and date of birth.      Patient understands and accepts financial responsibility for any deductible, Therapy Supplies (NEBULIZER/TUBING/MOUTHPIECE) Does not apply Kit Use with Albuteral as directed 1 kit 0   • Halobetasol Propionate 0.05 % External Ointment Apply a thin layer bid to trunk ,both arms and both legs 100 g 5   • Betamethasone Dipropionate 0.0 limitations of this visit as no physical exam could be performed. Every conscious effort was taken to allow for sufficient and adequate time. This billing was spent on reviewing labs, medications, radiology tests and decision making.   Appropriate medical

## 2021-01-06 ENCOUNTER — HOSPITAL ENCOUNTER (OUTPATIENT)
Dept: CT IMAGING | Age: 51
Discharge: HOME | End: 2021-01-06
Attending: NURSE PRACTITIONER
Payer: COMMERCIAL

## 2021-01-06 ENCOUNTER — HOSPITAL ENCOUNTER (OUTPATIENT)
Dept: GENERAL RADIOLOGY | Age: 51
Discharge: HOME | End: 2021-01-06
Attending: NURSE PRACTITIONER
Payer: COMMERCIAL

## 2021-01-06 DIAGNOSIS — R07.9 CHEST PAIN, UNSPECIFIED TYPE: ICD-10-CM

## 2021-01-06 DIAGNOSIS — J32.4 CHRONIC PANSINUSITIS: ICD-10-CM

## 2021-01-06 PROCEDURE — 71046 X-RAY EXAM CHEST 2 VIEWS: CPT | Performed by: NURSE PRACTITIONER

## 2021-01-06 PROCEDURE — 70486 CT MAXILLOFACIAL W/O DYE: CPT | Performed by: NURSE PRACTITIONER

## 2021-01-08 DIAGNOSIS — J34.2 NASAL SEPTAL DEVIATION: Primary | ICD-10-CM

## 2021-01-08 RX ORDER — BUDESONIDE AND FORMOTEROL FUMARATE DIHYDRATE 160; 4.5 UG/1; UG/1
2 AEROSOL RESPIRATORY (INHALATION) 2 TIMES DAILY
Qty: 1 INHALER | Refills: 2 | Status: SHIPPED | OUTPATIENT
Start: 2021-01-08 | End: 2021-03-08

## 2021-01-23 ENCOUNTER — LAB ENCOUNTER (OUTPATIENT)
Dept: LAB | Facility: HOSPITAL | Age: 51
End: 2021-01-23
Attending: INTERNAL MEDICINE
Payer: COMMERCIAL

## 2021-01-23 DIAGNOSIS — K50.10 CROHN'S DISEASE OF LARGE INTESTINE WITHOUT COMPLICATION (HCC): ICD-10-CM

## 2021-01-23 LAB
ALBUMIN SERPL-MCNC: 3.8 G/DL (ref 3.4–5)
ALP LIVER SERPL-CCNC: 139 U/L
ALT SERPL-CCNC: 43 U/L
AST SERPL-CCNC: 21 U/L (ref 15–37)
BASOPHILS # BLD AUTO: 0.08 X10(3) UL (ref 0–0.2)
BASOPHILS NFR BLD AUTO: 1 %
BILIRUB DIRECT SERPL-MCNC: <0.1 MG/DL (ref 0–0.2)
BILIRUB SERPL-MCNC: 0.4 MG/DL (ref 0.1–2)
DEPRECATED RDW RBC AUTO: 46.4 FL (ref 35.1–46.3)
EOSINOPHIL # BLD AUTO: 0.51 X10(3) UL (ref 0–0.7)
EOSINOPHIL NFR BLD AUTO: 6.3 %
ERYTHROCYTE [DISTWIDTH] IN BLOOD BY AUTOMATED COUNT: 12.4 % (ref 11–15)
HCT VFR BLD AUTO: 39.8 %
HGB BLD-MCNC: 12.7 G/DL
IMM GRANULOCYTES # BLD AUTO: 0.02 X10(3) UL (ref 0–1)
IMM GRANULOCYTES NFR BLD: 0.2 %
LYMPHOCYTES # BLD AUTO: 3.06 X10(3) UL (ref 1–4)
LYMPHOCYTES NFR BLD AUTO: 37.6 %
M PROTEIN MFR SERPL ELPH: 8 G/DL (ref 6.4–8.2)
MCH RBC QN AUTO: 32.9 PG (ref 26–34)
MCHC RBC AUTO-ENTMCNC: 31.9 G/DL (ref 31–37)
MCV RBC AUTO: 103.1 FL
MONOCYTES # BLD AUTO: 0.37 X10(3) UL (ref 0.1–1)
MONOCYTES NFR BLD AUTO: 4.6 %
NEUTROPHILS # BLD AUTO: 4.09 X10 (3) UL (ref 1.5–7.7)
NEUTROPHILS # BLD AUTO: 4.09 X10(3) UL (ref 1.5–7.7)
NEUTROPHILS NFR BLD AUTO: 50.3 %
PLATELET # BLD AUTO: 381 10(3)UL (ref 150–450)
RBC # BLD AUTO: 3.86 X10(6)UL
WBC # BLD AUTO: 8.1 X10(3) UL (ref 4–11)

## 2021-01-23 PROCEDURE — 85025 COMPLETE CBC W/AUTO DIFF WBC: CPT

## 2021-01-23 PROCEDURE — 82397 CHEMILUMINESCENT ASSAY: CPT

## 2021-01-23 PROCEDURE — 80299 QUANTITATIVE ASSAY DRUG: CPT

## 2021-01-23 PROCEDURE — 36415 COLL VENOUS BLD VENIPUNCTURE: CPT

## 2021-01-23 PROCEDURE — 80076 HEPATIC FUNCTION PANEL: CPT

## 2021-01-25 LAB
ADALIMUMAB ACTIVITY: 12.67 UG/ML
ADALIMUMAB NEUTRALIZING ANTIBODY: NOT DETECTED

## 2021-01-28 NOTE — TELEPHONE ENCOUNTER
LOV: 1/5/2021 with GALILEA Le  RTC: no follow-up on file  Last Relevant Labs: 1/23/2021  Filled: 11/2/2020   #90 with 0 refills    Future Appointments   Date Time Provider Maryam Amezcua   2/25/2021  3:50 PM Jose Elias Valladares MD [address] [facility]

## 2021-01-29 RX ORDER — LISINOPRIL 20 MG/1
20 TABLET ORAL DAILY
Qty: 30 TABLET | Refills: 0 | Status: SHIPPED | OUTPATIENT
Start: 2021-01-29 | End: 2021-03-01

## 2021-02-04 ENCOUNTER — HOSPITAL ENCOUNTER (OUTPATIENT)
Dept: MAMMOGRAPHY | Age: 51
Discharge: HOME | End: 2021-02-04
Attending: NURSE PRACTITIONER
Payer: COMMERCIAL

## 2021-02-04 DIAGNOSIS — Z12.31 SCREENING MAMMOGRAM, ENCOUNTER FOR: ICD-10-CM

## 2021-02-04 PROCEDURE — 77063 BREAST TOMOSYNTHESIS BI: CPT | Performed by: NURSE PRACTITIONER

## 2021-02-04 PROCEDURE — 77067 SCR MAMMO BI INCL CAD: CPT | Performed by: NURSE PRACTITIONER

## 2021-02-05 ENCOUNTER — HOSPITAL ENCOUNTER (OUTPATIENT)
Dept: MAMMOGRAPHY | Facility: HOSPITAL | Age: 51
Discharge: HOME | End: 2021-02-05
Attending: NURSE PRACTITIONER
Payer: COMMERCIAL

## 2021-02-05 DIAGNOSIS — R92.2 INCONCLUSIVE MAMMOGRAM: ICD-10-CM

## 2021-02-05 PROCEDURE — 76641 ULTRASOUND BREAST COMPLETE: CPT | Performed by: NURSE PRACTITIONER

## 2021-02-05 PROCEDURE — 77065 DX MAMMO INCL CAD UNI: CPT | Performed by: NURSE PRACTITIONER

## 2021-02-05 PROCEDURE — 77061 BREAST TOMOSYNTHESIS UNI: CPT | Performed by: NURSE PRACTITIONER

## 2021-02-15 ENCOUNTER — MED REC SCAN ONLY (OUTPATIENT)
Dept: INTERNAL MEDICINE CLINIC | Facility: CLINIC | Age: 51
End: 2021-02-15

## 2021-03-01 RX ORDER — LISINOPRIL 20 MG/1
20 TABLET ORAL DAILY
Qty: 30 TABLET | Refills: 0 | Status: SHIPPED | OUTPATIENT
Start: 2021-03-01 | End: 2021-03-31 | Stop reason: DRUGHIGH

## 2021-03-01 NOTE — TELEPHONE ENCOUNTER
LOV: 12/23/2020 with GALILEA Post  RTC: no follow-up on file  Last Relevant Labs: 1/23/2021  Filled: 1/29/2021   #30 with 0 refills    No future appointments.

## 2021-03-03 ENCOUNTER — PATIENT OUTREACH (OUTPATIENT)
Dept: INTERNAL MEDICINE CLINIC | Facility: CLINIC | Age: 51
End: 2021-03-03

## 2021-03-03 RX ORDER — LISINOPRIL 20 MG/1
20 TABLET ORAL DAILY
Qty: 90 TABLET | Refills: 0 | OUTPATIENT
Start: 2021-03-03

## 2021-03-06 ENCOUNTER — HOSPITAL ENCOUNTER (OUTPATIENT)
Dept: CT IMAGING | Age: 51
Discharge: HOME | End: 2021-03-06
Attending: OTOLARYNGOLOGY
Payer: COMMERCIAL

## 2021-03-06 DIAGNOSIS — J32.8 OTHER CHRONIC SINUSITIS: ICD-10-CM

## 2021-03-06 PROCEDURE — 70486 CT MAXILLOFACIAL W/O DYE: CPT | Performed by: OTOLARYNGOLOGY

## 2021-03-06 RX ORDER — LISINOPRIL 20 MG/1
20 TABLET ORAL DAILY
Qty: 30 TABLET | Refills: 0 | OUTPATIENT
Start: 2021-03-06

## 2021-03-06 NOTE — TELEPHONE ENCOUNTER
Protocol failed   Medication(s) to Refill:   Requested Prescriptions     Pending Prescriptions Disp Refills   • lisinopril 20 MG Oral Tab 30 tablet 0     Sig: Take 1 tablet (20 mg total) by mouth daily.        LOV: 8/13/2020   RTC: cpx and pap     Last Refi children will not be permitted in the exam rooms, unless otherwise noted and in accordance with departmental policy.      PATIENT RESPONSIBILITY ESTIMATE    - (Estimate) We will provide you with your estimated remaining deductible and coinsurance balance fo

## 2021-03-08 ENCOUNTER — TELEPHONE (OUTPATIENT)
Dept: INTERNAL MEDICINE CLINIC | Facility: CLINIC | Age: 51
End: 2021-03-08

## 2021-03-08 NOTE — TELEPHONE ENCOUNTER
Insurance prefers Advair Diskus inhaler over Symbicort. Spoke with patient stating she prefers to continue with the Symbicort inhaler as it has been working well for her. Insurance prefers 90 day supply on Branded inhalers, order pended for MD review.

## 2021-03-09 RX ORDER — BUDESONIDE AND FORMOTEROL FUMARATE DIHYDRATE 160; 4.5 UG/1; UG/1
2 AEROSOL RESPIRATORY (INHALATION) 2 TIMES DAILY
Qty: 3 INHALER | Refills: 0 | Status: SHIPPED | OUTPATIENT
Start: 2021-03-09 | End: 2021-11-29

## 2021-03-09 RX ORDER — LISINOPRIL 20 MG/1
20 TABLET ORAL DAILY
Qty: 90 TABLET | Refills: 0 | OUTPATIENT
Start: 2021-03-09

## 2021-03-13 ENCOUNTER — LAB ENCOUNTER (OUTPATIENT)
Dept: LAB | Age: 51
End: 2021-03-13
Attending: INTERNAL MEDICINE
Payer: COMMERCIAL

## 2021-03-13 ENCOUNTER — EKG ENCOUNTER (OUTPATIENT)
Dept: LAB | Age: 51
End: 2021-03-13
Attending: INTERNAL MEDICINE
Payer: COMMERCIAL

## 2021-03-13 DIAGNOSIS — I10 HYPERTENSION, UNSPECIFIED TYPE: Primary | ICD-10-CM

## 2021-03-13 DIAGNOSIS — I10 HYPERTENSION, UNSPECIFIED TYPE: ICD-10-CM

## 2021-03-13 LAB
ALBUMIN SERPL-MCNC: 3.9 G/DL (ref 3.4–5)
ALBUMIN/GLOB SERPL: 0.9 {RATIO} (ref 1–2)
ALP LIVER SERPL-CCNC: 144 U/L
ALT SERPL-CCNC: 32 U/L
ANION GAP SERPL CALC-SCNC: 4 MMOL/L (ref 0–18)
AST SERPL-CCNC: 14 U/L (ref 15–37)
BILIRUB SERPL-MCNC: 0.4 MG/DL (ref 0.1–2)
BUN BLD-MCNC: 9 MG/DL (ref 7–18)
BUN/CREAT SERPL: 7.1 (ref 10–20)
CALCIUM BLD-MCNC: 9.6 MG/DL (ref 8.5–10.1)
CHLORIDE SERPL-SCNC: 107 MMOL/L (ref 98–112)
CO2 SERPL-SCNC: 27 MMOL/L (ref 21–32)
CREAT BLD-MCNC: 1.26 MG/DL
GLOBULIN PLAS-MCNC: 4.3 G/DL (ref 2.8–4.4)
GLUCOSE BLD-MCNC: 96 MG/DL (ref 70–99)
M PROTEIN MFR SERPL ELPH: 8.2 G/DL (ref 6.4–8.2)
OSMOLALITY SERPL CALC.SUM OF ELEC: 285 MOSM/KG (ref 275–295)
PATIENT FASTING Y/N/NP: NO
POTASSIUM SERPL-SCNC: 4 MMOL/L (ref 3.5–5.1)
SODIUM SERPL-SCNC: 138 MMOL/L (ref 136–145)

## 2021-03-13 PROCEDURE — 36415 COLL VENOUS BLD VENIPUNCTURE: CPT

## 2021-03-13 PROCEDURE — 93010 ELECTROCARDIOGRAM REPORT: CPT | Performed by: INTERNAL MEDICINE

## 2021-03-13 PROCEDURE — 80053 COMPREHEN METABOLIC PANEL: CPT

## 2021-03-13 PROCEDURE — 93005 ELECTROCARDIOGRAM TRACING: CPT

## 2021-03-15 LAB
ATRIAL RATE: 83 BPM
P AXIS: 64 DEGREES
P-R INTERVAL: 154 MS
Q-T INTERVAL: 384 MS
QRS DURATION: 78 MS
QTC CALCULATION (BEZET): 451 MS
R AXIS: 31 DEGREES
T AXIS: 52 DEGREES
VENTRICULAR RATE: 83 BPM

## 2021-03-19 ENCOUNTER — LAB ENCOUNTER (OUTPATIENT)
Dept: LAB | Age: 51
End: 2021-03-19
Attending: OTOLARYNGOLOGY
Payer: COMMERCIAL

## 2021-03-19 DIAGNOSIS — J32.2 CHRONIC ETHMOIDAL SINUSITIS: ICD-10-CM

## 2021-03-20 LAB — SARS-COV-2 RNA RESP QL NAA+PROBE: NOT DETECTED

## 2021-03-23 ENCOUNTER — LAB REQUISITION (OUTPATIENT)
Dept: LAB | Facility: HOSPITAL | Age: 51
End: 2021-03-23
Payer: COMMERCIAL

## 2021-03-23 DIAGNOSIS — J34.3 HYPERTROPHY OF NASAL TURBINATES: ICD-10-CM

## 2021-03-23 DIAGNOSIS — J34.2 DEVIATED NASAL SEPTUM: ICD-10-CM

## 2021-03-23 PROCEDURE — 88300 SURGICAL PATH GROSS: CPT | Performed by: OTOLARYNGOLOGY

## 2021-03-23 PROCEDURE — 88304 TISSUE EXAM BY PATHOLOGIST: CPT | Performed by: OTOLARYNGOLOGY

## 2021-03-26 NOTE — PROGRESS NOTES
Future Appointments   Date Time Provider Maryam Amezcua   3/31/2021  8:00 AM VIRGINIA Sevilla [facility] [place]

## 2021-03-31 ENCOUNTER — OFFICE VISIT (OUTPATIENT)
Dept: INTERNAL MEDICINE CLINIC | Facility: CLINIC | Age: 51
End: 2021-03-31

## 2021-03-31 VITALS
RESPIRATION RATE: 16 BRPM | OXYGEN SATURATION: 98 % | HEART RATE: 92 BPM | TEMPERATURE: 99 F | BODY MASS INDEX: 35.61 KG/M2 | DIASTOLIC BLOOD PRESSURE: 88 MMHG | WEIGHT: 193.5 LBS | SYSTOLIC BLOOD PRESSURE: 122 MMHG | HEIGHT: 62 IN

## 2021-03-31 DIAGNOSIS — M25.531 ACUTE PAIN OF RIGHT WRIST: ICD-10-CM

## 2021-03-31 DIAGNOSIS — I10 ESSENTIAL HYPERTENSION, BENIGN: Primary | ICD-10-CM

## 2021-03-31 PROCEDURE — 3079F DIAST BP 80-89 MM HG: CPT | Performed by: FAMILY MEDICINE

## 2021-03-31 PROCEDURE — 3074F SYST BP LT 130 MM HG: CPT | Performed by: FAMILY MEDICINE

## 2021-03-31 PROCEDURE — 99214 OFFICE O/P EST MOD 30 MIN: CPT | Performed by: FAMILY MEDICINE

## 2021-03-31 PROCEDURE — 3008F BODY MASS INDEX DOCD: CPT | Performed by: FAMILY MEDICINE

## 2021-03-31 RX ORDER — HYDROCODONE BITARTRATE AND ACETAMINOPHEN 5; 325 MG/1; MG/1
1 TABLET ORAL
COMMUNITY
Start: 2021-03-23 | End: 2021-04-21

## 2021-03-31 RX ORDER — METHYLPREDNISOLONE 4 MG/1
TABLET ORAL
Qty: 1 KIT | Refills: 0 | Status: SHIPPED | OUTPATIENT
Start: 2021-03-31 | End: 2021-04-21 | Stop reason: ALTCHOICE

## 2021-03-31 RX ORDER — CEFUROXIME AXETIL 250 MG/1
250 TABLET ORAL 2 TIMES DAILY
COMMUNITY
Start: 2021-03-23 | End: 2021-04-21 | Stop reason: ALTCHOICE

## 2021-03-31 RX ORDER — LISINOPRIL 20 MG/1
20 TABLET ORAL DAILY
Qty: 90 TABLET | Refills: 0 | Status: CANCELLED | OUTPATIENT
Start: 2021-03-31

## 2021-03-31 RX ORDER — LISINOPRIL 30 MG/1
30 TABLET ORAL DAILY
Qty: 90 TABLET | Refills: 0 | Status: SHIPPED | OUTPATIENT
Start: 2021-03-31 | End: 2021-06-29

## 2021-03-31 NOTE — PROGRESS NOTES
CHIEF COMPLAINT:     Patient presents with: Follow - Up: HTN       HPI:   Jadyn Bentley is a 44year old female   Patient presents for recheck of their hypertension. Pt has been taking medications as instructed, no home BP monitoring.   Currently asympto Ointment Apply 1 g topically 2 (two) times daily. 45 g 3   • Cetirizine HCl (ZYRTEC ALLERGY OR) Take by mouth daily. • Multiple Vitamin (MULTIVITAMIN OR) Take 1 Tab by mouth daily.         Past Medical History:   Diagnosis Date   • Asthma    • Crohn dis hypertension, benign  (primary encounter diagnosis)  Acute pain of right wrist    No orders of the defined types were placed in this encounter.       Meds & Refills for this Visit:  Requested Prescriptions     Signed Prescriptions Disp Refills   • methylPRE

## 2021-04-18 RX ORDER — ERGOCALCIFEROL 1.25 MG/1
CAPSULE ORAL
Qty: 13 CAPSULE | Refills: 0 | OUTPATIENT
Start: 2021-04-18

## 2021-04-21 ENCOUNTER — LAB ENCOUNTER (OUTPATIENT)
Dept: LAB | Age: 51
End: 2021-04-21
Attending: FAMILY MEDICINE
Payer: COMMERCIAL

## 2021-04-21 ENCOUNTER — OFFICE VISIT (OUTPATIENT)
Dept: INTERNAL MEDICINE CLINIC | Facility: CLINIC | Age: 51
End: 2021-04-21

## 2021-04-21 VITALS
HEART RATE: 80 BPM | HEIGHT: 62 IN | SYSTOLIC BLOOD PRESSURE: 110 MMHG | RESPIRATION RATE: 16 BRPM | BODY MASS INDEX: 35.65 KG/M2 | TEMPERATURE: 98 F | WEIGHT: 193.75 LBS | OXYGEN SATURATION: 98 % | DIASTOLIC BLOOD PRESSURE: 80 MMHG

## 2021-04-21 DIAGNOSIS — M25.531 RIGHT WRIST PAIN: Primary | ICD-10-CM

## 2021-04-21 DIAGNOSIS — R53.83 FATIGUE, UNSPECIFIED TYPE: ICD-10-CM

## 2021-04-21 DIAGNOSIS — M79.641 RIGHT HAND PAIN: ICD-10-CM

## 2021-04-21 DIAGNOSIS — Z00.00 LABORATORY EXAMINATION ORDERED AS PART OF A ROUTINE GENERAL MEDICAL EXAMINATION: ICD-10-CM

## 2021-04-21 PROCEDURE — 86664 EPSTEIN-BARR NUCLEAR ANTIGEN: CPT

## 2021-04-21 PROCEDURE — 83735 ASSAY OF MAGNESIUM: CPT

## 2021-04-21 PROCEDURE — 86665 EPSTEIN-BARR CAPSID VCA: CPT

## 2021-04-21 PROCEDURE — 82728 ASSAY OF FERRITIN: CPT

## 2021-04-21 PROCEDURE — 80061 LIPID PANEL: CPT

## 2021-04-21 PROCEDURE — 82607 VITAMIN B-12: CPT

## 2021-04-21 PROCEDURE — 3074F SYST BP LT 130 MM HG: CPT | Performed by: FAMILY MEDICINE

## 2021-04-21 PROCEDURE — 83036 HEMOGLOBIN GLYCOSYLATED A1C: CPT

## 2021-04-21 PROCEDURE — 36415 COLL VENOUS BLD VENIPUNCTURE: CPT

## 2021-04-21 PROCEDURE — 85025 COMPLETE CBC W/AUTO DIFF WBC: CPT

## 2021-04-21 PROCEDURE — 80053 COMPREHEN METABOLIC PANEL: CPT

## 2021-04-21 PROCEDURE — 3079F DIAST BP 80-89 MM HG: CPT | Performed by: FAMILY MEDICINE

## 2021-04-21 PROCEDURE — 99214 OFFICE O/P EST MOD 30 MIN: CPT | Performed by: FAMILY MEDICINE

## 2021-04-21 PROCEDURE — 82306 VITAMIN D 25 HYDROXY: CPT

## 2021-04-21 PROCEDURE — 84443 ASSAY THYROID STIM HORMONE: CPT

## 2021-04-21 PROCEDURE — 3008F BODY MASS INDEX DOCD: CPT | Performed by: FAMILY MEDICINE

## 2021-04-21 NOTE — PROGRESS NOTES
CHIEF COMPLAINT:     Patient presents with:  Fatigue  Numbness: right hand and arm       HPI:   Kiera Buitrago is a 44year old female . The patient complains of fatigue. The patient has had since last Monday after she got her covid vaccine.  Patient desc • Multiple Vitamin (MULTIVITAMIN OR) Take 1 Tab by mouth daily.         Past Medical History:   Diagnosis Date   • Asthma    • Crohn disease ([facility] [address]    • Essential hypertension    • Extrinsic asthma, unspecified    • Sinusitis 9/16/2013      Social History: lymphadenopathy.       Physical Exam    ASSESSMENT AND PLAN:     Right wrist pain  (primary encounter diagnosis)  Right hand pain  Laboratory examination ordered as part of a routine general medical examination  Fatigue, unspecified type    Orders Placed Th

## 2021-04-22 DIAGNOSIS — E55.9 VITAMIN D DEFICIENCY: Primary | ICD-10-CM

## 2021-04-22 DIAGNOSIS — R74.8 ELEVATED VITAMIN B12 LEVEL: ICD-10-CM

## 2021-04-22 DIAGNOSIS — R73.03 PREDIABETES: ICD-10-CM

## 2021-06-11 ENCOUNTER — ORDER TRANSCRIPTION (OUTPATIENT)
Dept: ADMINISTRATIVE | Facility: HOSPITAL | Age: 51
End: 2021-06-11

## 2021-06-11 DIAGNOSIS — R20.2 NUMBNESS AND TINGLING OF RIGHT ARM: Primary | ICD-10-CM

## 2021-06-11 DIAGNOSIS — R20.0 NUMBNESS AND TINGLING OF RIGHT ARM: Primary | ICD-10-CM

## 2021-06-18 ENCOUNTER — TELEPHONE (OUTPATIENT)
Dept: ADMINISTRATIVE | Age: 51
End: 2021-06-18

## 2021-06-18 ENCOUNTER — TELEPHONE (OUTPATIENT)
Dept: INTERNAL MEDICINE CLINIC | Facility: CLINIC | Age: 51
End: 2021-06-18

## 2021-06-18 DIAGNOSIS — H53.9 VISION DISTURBANCE: Primary | ICD-10-CM

## 2021-06-18 DIAGNOSIS — H53.8 BLURRY VISION: Primary | ICD-10-CM

## 2021-06-18 NOTE — TELEPHONE ENCOUNTER
Pt called requesting a referral for an Ophthalmologist for blurriness in her eye.  Pt would like to referral for  at [facility]

## 2021-06-18 NOTE — TELEPHONE ENCOUNTER
Spoke with patient 2-3 months ago started having left eye blurriness, has been seen at [facility] several times, (see visit 11/12/2020) and was told to follow with Ophthalmologist, no headaches, no floaters or any other symptoms.  Patient indicates left voice messa

## 2021-06-25 ENCOUNTER — TELEPHONE (OUTPATIENT)
Dept: INTERNAL MEDICINE CLINIC | Facility: CLINIC | Age: 51
End: 2021-06-25

## 2021-06-25 DIAGNOSIS — M25.531 RIGHT WRIST PAIN: Primary | ICD-10-CM

## 2021-06-25 DIAGNOSIS — M79.641 RIGHT HAND PAIN: ICD-10-CM

## 2021-06-25 NOTE — TELEPHONE ENCOUNTER
Referral pended for your review and approval if approval if appropriate, please advise. Please complete referral. Thank you. PLAN:  1. Right wrist and hand pain  Tylenol for the pain as needed. Ice the area as needed.  Recommend Carpal Tunnel splint

## 2021-06-25 NOTE — TELEPHONE ENCOUNTER
Patient stated she was seen on 4/21 with Hannah Andrade and she was supposed to get a referral for Dr Alley Young for [address] [place] [address] in [place].  Patient is very upset because they told her there is no referral placed and she has waited 2 months for

## 2021-06-28 ENCOUNTER — PROCEDURE VISIT (OUTPATIENT)
Dept: NEUROLOGY | Facility: CLINIC | Age: 51
End: 2021-06-28

## 2021-06-28 DIAGNOSIS — R20.0 NUMBNESS AND TINGLING OF RIGHT HAND: Primary | ICD-10-CM

## 2021-06-28 DIAGNOSIS — R20.2 NUMBNESS AND TINGLING OF RIGHT HAND: Primary | ICD-10-CM

## 2021-06-28 PROCEDURE — 95909 NRV CNDJ TST 5-6 STUDIES: CPT | Performed by: OTHER

## 2021-06-28 PROCEDURE — 95886 MUSC TEST DONE W/N TEST COMP: CPT | Performed by: OTHER

## 2021-06-28 NOTE — PROCEDURES
[facility]  [address] [facility] [address] [place] [facility]  Ph: [phone]  FAX: [phone]        Full Name: Lisa Clark Gender: Female  Patient ID: [id number] YOB: 1970      Visit Date: 6/25/2021 12:32  A 100 5.99 27.7 Wrist - APB 7        Elbow APB 7.19 8.1 100 6.25 27.6 Elbow - Wrist 22.5 4.22 53   R Ulnar - ADM      Wrist ADM 2.45 9.0 100 5.16 26.4 Wrist - ADM 7        B. Elbow ADM 5.89 9.2 102 6.56 28.8 B. Elbow - Wrist 20 3.44 62       F  Wave      Nerve not rule out a small fiber neuropathy, central etiology, or mild chronic cervical radiculopathy, with sensory involvement. Clinical correlation is advised.     Suezanne Fothergill, MD, Neurology  [facility]  Pager [phone]  6/28/2021

## 2021-06-29 DIAGNOSIS — I10 ESSENTIAL HYPERTENSION, BENIGN: ICD-10-CM

## 2021-06-29 RX ORDER — LISINOPRIL 30 MG/1
30 TABLET ORAL DAILY
Qty: 90 TABLET | Refills: 0 | Status: SHIPPED | OUTPATIENT
Start: 2021-06-29 | End: 2021-10-11

## 2021-07-19 ENCOUNTER — TELEPHONE (OUTPATIENT)
Dept: INTERNAL MEDICINE CLINIC | Facility: CLINIC | Age: 51
End: 2021-07-19

## 2021-07-19 DIAGNOSIS — H53.8 BLURRED VISION: ICD-10-CM

## 2021-07-19 DIAGNOSIS — H53.9 VISION DISTURBANCE: Primary | ICD-10-CM

## 2021-07-19 NOTE — TELEPHONE ENCOUNTER
Patient called stating that ophthalmologist is requesting further testing for patient. Dr. Teja Elizabeth office faxed request on Friday for a CT and MRI order.  Please confirm if fax was received and place orders

## 2021-07-20 NOTE — TELEPHONE ENCOUNTER
Patient called very upset that she has not heard anything about the paperwork. Patient said it is ridiculous she has had to wait until Friday and then disconnected the call.  ROYCE

## 2021-07-20 NOTE — TELEPHONE ENCOUNTER
Spoke to patient and notified her we received eye exam report dated 7/16/2021 from [facility] [address] [facility]  (eye exam report placed on Dr. Rory Bowers laptop for review).   In the report stated \"Additionally recommended CT of orbits/brain t

## 2021-07-21 NOTE — TELEPHONE ENCOUNTER
I spoke with Kareem Villalpando from  to inquire about CT orders/scheduling.      Kareem Villalpando confirmed that she is able to schedule appointment and pt was transferred to Kareem Villalpando to assist.

## 2021-07-21 NOTE — TELEPHONE ENCOUNTER
Pt called stating she called central scheduling to schedule and they told her the order is not authorized.  Please advise

## 2021-07-22 ENCOUNTER — HOSPITAL ENCOUNTER (OUTPATIENT)
Dept: CT IMAGING | Age: 51
Discharge: HOME | End: 2021-07-22
Attending: FAMILY MEDICINE
Payer: COMMERCIAL

## 2021-07-22 DIAGNOSIS — H53.9 VISION DISTURBANCE: ICD-10-CM

## 2021-07-22 DIAGNOSIS — H53.8 BLURRED VISION: ICD-10-CM

## 2021-07-22 PROCEDURE — 70480 CT ORBIT/EAR/FOSSA W/O DYE: CPT | Performed by: FAMILY MEDICINE

## 2021-07-22 PROCEDURE — 70450 CT HEAD/BRAIN W/O DYE: CPT | Performed by: FAMILY MEDICINE

## 2021-07-22 PROCEDURE — 76377 3D RENDER W/INTRP POSTPROCES: CPT | Performed by: FAMILY MEDICINE

## 2021-08-05 ENCOUNTER — TELEPHONE (OUTPATIENT)
Dept: INTERNAL MEDICINE CLINIC | Facility: CLINIC | Age: 51
End: 2021-08-05

## 2021-08-22 ENCOUNTER — APPOINTMENT (OUTPATIENT)
Dept: GENERAL RADIOLOGY | Age: 51
End: 2021-08-22
Attending: EMERGENCY MEDICINE
Payer: COMMERCIAL

## 2021-08-22 ENCOUNTER — HOSPITAL ENCOUNTER (EMERGENCY)
Age: 51
Discharge: HOME | End: 2021-08-22
Attending: EMERGENCY MEDICINE
Payer: COMMERCIAL

## 2021-08-22 ENCOUNTER — APPOINTMENT (OUTPATIENT)
Dept: CT IMAGING | Age: 51
End: 2021-08-22
Attending: EMERGENCY MEDICINE
Payer: COMMERCIAL

## 2021-08-22 VITALS
RESPIRATION RATE: 20 BRPM | SYSTOLIC BLOOD PRESSURE: 125 MMHG | TEMPERATURE: 97 F | WEIGHT: 175 LBS | HEIGHT: 62 IN | HEART RATE: 75 BPM | OXYGEN SATURATION: 100 % | DIASTOLIC BLOOD PRESSURE: 67 MMHG | BODY MASS INDEX: 32.2 KG/M2

## 2021-08-22 DIAGNOSIS — R07.89 CHEST PAIN, ATYPICAL: Primary | ICD-10-CM

## 2021-08-22 LAB
ALBUMIN SERPL-MCNC: 3.6 G/DL (ref 3.4–5)
ALBUMIN/GLOB SERPL: 0.8 {RATIO} (ref 1–2)
ALP LIVER SERPL-CCNC: 162 U/L
ALT SERPL-CCNC: 31 U/L
ANION GAP SERPL CALC-SCNC: 3 MMOL/L (ref 0–18)
AST SERPL-CCNC: 29 U/L (ref 15–37)
ATRIAL RATE: 81 BPM
BASOPHILS # BLD AUTO: 0.05 X10(3) UL (ref 0–0.2)
BASOPHILS NFR BLD AUTO: 1.1 %
BILIRUB SERPL-MCNC: 0.5 MG/DL (ref 0.1–2)
BUN BLD-MCNC: 8 MG/DL (ref 7–18)
CALCIUM BLD-MCNC: 8.8 MG/DL (ref 8.5–10.1)
CHLORIDE SERPL-SCNC: 108 MMOL/L (ref 98–112)
CO2 SERPL-SCNC: 25 MMOL/L (ref 21–32)
CREAT BLD-MCNC: 1.11 MG/DL
D-DIMER: 0.87 UG/ML FEU (ref ?–0.53)
EOSINOPHIL # BLD AUTO: 0.21 X10(3) UL (ref 0–0.7)
EOSINOPHIL NFR BLD AUTO: 4.5 %
ERYTHROCYTE [DISTWIDTH] IN BLOOD BY AUTOMATED COUNT: 12.7 %
GLOBULIN PLAS-MCNC: 4.7 G/DL (ref 2.8–4.4)
GLUCOSE BLD-MCNC: 96 MG/DL (ref 70–99)
HCT VFR BLD AUTO: 40.8 %
HGB BLD-MCNC: 13.3 G/DL
IMM GRANULOCYTES # BLD AUTO: 0.02 X10(3) UL (ref 0–1)
IMM GRANULOCYTES NFR BLD: 0.4 %
LIPASE SERPL-CCNC: 235 U/L (ref 73–393)
LYMPHOCYTES # BLD AUTO: 1.76 X10(3) UL (ref 1–4)
LYMPHOCYTES NFR BLD AUTO: 37.5 %
M PROTEIN MFR SERPL ELPH: 8.3 G/DL (ref 6.4–8.2)
MCH RBC QN AUTO: 31.8 PG (ref 26–34)
MCHC RBC AUTO-ENTMCNC: 32.6 G/DL (ref 31–37)
MCV RBC AUTO: 97.6 FL
MONOCYTES # BLD AUTO: 0.33 X10(3) UL (ref 0.1–1)
MONOCYTES NFR BLD AUTO: 7 %
NEUTROPHILS # BLD AUTO: 2.32 X10 (3) UL (ref 1.5–7.7)
NEUTROPHILS # BLD AUTO: 2.32 X10(3) UL (ref 1.5–7.7)
NEUTROPHILS NFR BLD AUTO: 49.5 %
OSMOLALITY SERPL CALC.SUM OF ELEC: 280 MOSM/KG (ref 275–295)
P AXIS: 48 DEGREES
P-R INTERVAL: 160 MS
PLATELET # BLD AUTO: 314 10(3)UL (ref 150–450)
POTASSIUM SERPL-SCNC: 4.6 MMOL/L (ref 3.5–5.1)
Q-T INTERVAL: 382 MS
QRS DURATION: 78 MS
QTC CALCULATION (BEZET): 443 MS
R AXIS: 24 DEGREES
RBC # BLD AUTO: 4.18 X10(6)UL
SARS-COV-2 RNA RESP QL NAA+PROBE: NOT DETECTED
SODIUM SERPL-SCNC: 136 MMOL/L (ref 136–145)
T AXIS: 46 DEGREES
TROPONIN I SERPL-MCNC: <0.045 NG/ML (ref ?–0.04)
VENTRICULAR RATE: 81 BPM
WBC # BLD AUTO: 4.7 X10(3) UL (ref 4–11)

## 2021-08-22 PROCEDURE — 93010 ELECTROCARDIOGRAM REPORT: CPT

## 2021-08-22 PROCEDURE — 36415 COLL VENOUS BLD VENIPUNCTURE: CPT

## 2021-08-22 PROCEDURE — 84484 ASSAY OF TROPONIN QUANT: CPT | Performed by: EMERGENCY MEDICINE

## 2021-08-22 PROCEDURE — 93005 ELECTROCARDIOGRAM TRACING: CPT

## 2021-08-22 PROCEDURE — 71045 X-RAY EXAM CHEST 1 VIEW: CPT | Performed by: EMERGENCY MEDICINE

## 2021-08-22 PROCEDURE — 83690 ASSAY OF LIPASE: CPT | Performed by: EMERGENCY MEDICINE

## 2021-08-22 PROCEDURE — 99284 EMERGENCY DEPT VISIT MOD MDM: CPT

## 2021-08-22 PROCEDURE — 99285 EMERGENCY DEPT VISIT HI MDM: CPT

## 2021-08-22 PROCEDURE — 85025 COMPLETE CBC W/AUTO DIFF WBC: CPT | Performed by: EMERGENCY MEDICINE

## 2021-08-22 PROCEDURE — 71275 CT ANGIOGRAPHY CHEST: CPT | Performed by: EMERGENCY MEDICINE

## 2021-08-22 PROCEDURE — 80053 COMPREHEN METABOLIC PANEL: CPT | Performed by: EMERGENCY MEDICINE

## 2021-08-22 PROCEDURE — 85379 FIBRIN DEGRADATION QUANT: CPT | Performed by: EMERGENCY MEDICINE

## 2021-08-22 NOTE — ED PROVIDER NOTES
Patient Seen in: THE [facility] In [place]      History   Patient presents with:  Dizziness    Stated Complaint: since friday with lightheadedness and shaking    HPI/Subjective:   HPI    49-year-old female presents for evaluation of chest °C)   Temp src Temporal   SpO2 98 %   O2 Device None (Room air)       Current:/84   Pulse 73   Temp 97.3 °F (36.3 °C) (Temporal)   Resp 16   Ht 157.5 cm (5' 2\")   Wt 79.4 kg   SpO2 98%   BMI 32.01 kg/m²         Physical Exam    General: Alert, orien Rhythm  Reading: no ST elevation or depression                    MDM      EKG shows no acute ischemic changes.   Troponin is negative in the setting of constant pressure-like chest pain over the last few days      CT ANGIOGRAPHY, CHEST (CPT=71275)    Resul MD on 8/22/2021 at 3:08 PM     Finalized by (CST): Fouzia Luis MD on 8/22/2021 at 3:11 PM       XR CHEST AP PORTABLE  (CPT=71045)    Result Date: 8/22/2021            PROCEDURE:  XR CHEST AP PORTABLE  (CPT=71045)  TECHNIQUE:  AP chest radiograph was

## 2021-08-22 NOTE — ED INITIAL ASSESSMENT (HPI)
Began not feeling well on Friday- yesterday had head congestion and chest congestion- today awoke with shaking, congestion, sob and feeling of a \"knife is in my chest\"

## 2021-10-09 DIAGNOSIS — I10 ESSENTIAL HYPERTENSION, BENIGN: ICD-10-CM

## 2021-10-11 RX ORDER — LISINOPRIL 30 MG/1
30 TABLET ORAL DAILY
Qty: 90 TABLET | Refills: 0 | Status: SHIPPED | OUTPATIENT
Start: 2021-10-11 | End: 2021-11-29

## 2021-10-11 NOTE — TELEPHONE ENCOUNTER
Health Maintenance Due   Topic Date Due   • Depression Screening  09/03/2001   • DTaP/Tdap/Td Vaccine (7 - Td) 03/27/2018   • Influenza Vaccine (1) 09/01/2019       Patient is due for topics as listed above but is not proceeding with Immunization(s) Dtap/Tdap/Td and Influenza at this time.         
Needs to schedule Px. Refill done    Lisinopril 30 mg  Filled 6-29-21  Qty 90  0 refills  No upcoming appt.    LOV 03-31-21
Pt and Mini AbelNP)
Pt
pt/wife/daughter (c pt's permission)

## 2021-11-24 ENCOUNTER — TELEPHONE (OUTPATIENT)
Dept: INTERNAL MEDICINE CLINIC | Facility: CLINIC | Age: 51
End: 2021-11-24

## 2021-11-24 NOTE — TELEPHONE ENCOUNTER
Patient notified, ok to keep office appointment. Patient advised to proceed to UC/ER for any worsening s/s. Pt verbalized understanding.

## 2021-11-24 NOTE — TELEPHONE ENCOUNTER
Spoke with patient, reporting symptoms starting a few months ago following CT scans July-August     Headaches   Sinus pressure - left sided   Sneezing  Congestion    Denies fever, cough, chest pain, shortness of breath, body aches, chills.  No other s/s at

## 2021-11-26 ENCOUNTER — LAB ENCOUNTER (OUTPATIENT)
Dept: LAB | Age: 51
End: 2021-11-26
Attending: INTERNAL MEDICINE
Payer: COMMERCIAL

## 2021-11-26 DIAGNOSIS — E55.9 VITAMIN D DEFICIENCY: ICD-10-CM

## 2021-11-26 DIAGNOSIS — R74.8 ELEVATED LIVER ENZYMES: ICD-10-CM

## 2021-11-26 DIAGNOSIS — R74.8 ELEVATED VITAMIN B12 LEVEL: ICD-10-CM

## 2021-11-26 DIAGNOSIS — K50.10 CROHN'S DISEASE OF LARGE INTESTINE WITHOUT COMPLICATION (HCC): ICD-10-CM

## 2021-11-26 DIAGNOSIS — R73.03 PREDIABETES: ICD-10-CM

## 2021-11-26 PROCEDURE — 85025 COMPLETE CBC W/AUTO DIFF WBC: CPT

## 2021-11-26 PROCEDURE — 80076 HEPATIC FUNCTION PANEL: CPT

## 2021-11-26 PROCEDURE — 36415 COLL VENOUS BLD VENIPUNCTURE: CPT

## 2021-11-26 PROCEDURE — 83036 HEMOGLOBIN GLYCOSYLATED A1C: CPT

## 2021-11-26 PROCEDURE — 82607 VITAMIN B-12: CPT

## 2021-11-26 PROCEDURE — 82306 VITAMIN D 25 HYDROXY: CPT

## 2021-11-28 NOTE — PROGRESS NOTES
HPI:   Marek Malone is a 46year old female who presents for a complete physical exam. Symptoms: {MPN GYNE ROS:42784}. Patient complains of ***.    Regular SBE- no,Sexually active- yes,  Contraception- menopausal. STD history- none    Patient presents fo ***      Immunization History  Administered            Date(s) Administered    Covid-19 Vaccine Moderna 100 mcg/0.5 ml                          03/15/2021  04/12/2021      Influenza             09/28/2016      Influenza Vaccine, Preserv Free 160-4.5 MCG/ACT Inhalation Aerosol Inhale 2 puffs into the lungs 2 (two) times daily. 3 Inhaler 0   • ergocalciferol 1.25 MG (92797 UT) Oral Cap Take 1 capsule (50,000 Units total) by mouth once a week.  (Patient not taking: Reported on 8/22/2021 ) 12 capsu shortness of breath with exertion  CARDIOVASCULAR: denies chest pain on exertion,+HTN  GI: denies abdominal pain,denies heartburn,+Crohn's  : denies dysuria, vaginal discharge or itching  MUSCULOSKELETAL: denies back pain,+ polyarthritis,+RA  NEURO: jose

## 2021-11-29 ENCOUNTER — OFFICE VISIT (OUTPATIENT)
Dept: INTERNAL MEDICINE CLINIC | Facility: CLINIC | Age: 51
End: 2021-11-29

## 2021-11-29 VITALS
HEART RATE: 72 BPM | HEIGHT: 62 IN | WEIGHT: 188 LBS | RESPIRATION RATE: 16 BRPM | BODY MASS INDEX: 34.6 KG/M2 | TEMPERATURE: 98 F | SYSTOLIC BLOOD PRESSURE: 122 MMHG | DIASTOLIC BLOOD PRESSURE: 76 MMHG

## 2021-11-29 DIAGNOSIS — J45.30 MILD PERSISTENT ASTHMA WITHOUT COMPLICATION: ICD-10-CM

## 2021-11-29 DIAGNOSIS — J32.0 LEFT MAXILLARY SINUSITIS: ICD-10-CM

## 2021-11-29 DIAGNOSIS — I10 ESSENTIAL HYPERTENSION, BENIGN: Primary | ICD-10-CM

## 2021-11-29 PROCEDURE — 3074F SYST BP LT 130 MM HG: CPT | Performed by: FAMILY MEDICINE

## 2021-11-29 PROCEDURE — 99214 OFFICE O/P EST MOD 30 MIN: CPT | Performed by: FAMILY MEDICINE

## 2021-11-29 PROCEDURE — 3008F BODY MASS INDEX DOCD: CPT | Performed by: FAMILY MEDICINE

## 2021-11-29 PROCEDURE — 3078F DIAST BP <80 MM HG: CPT | Performed by: FAMILY MEDICINE

## 2021-11-29 RX ORDER — CEFDINIR 300 MG/1
300 CAPSULE ORAL 2 TIMES DAILY
Qty: 20 CAPSULE | Refills: 0 | Status: SHIPPED | OUTPATIENT
Start: 2021-11-29 | End: 2022-02-07

## 2021-11-29 RX ORDER — MONTELUKAST SODIUM 10 MG/1
10 TABLET ORAL DAILY
Qty: 90 TABLET | Refills: 0 | Status: SHIPPED | OUTPATIENT
Start: 2021-11-29 | End: 2022-11-24

## 2021-11-29 RX ORDER — BUDESONIDE AND FORMOTEROL FUMARATE DIHYDRATE 160; 4.5 UG/1; UG/1
2 AEROSOL RESPIRATORY (INHALATION) 2 TIMES DAILY
Qty: 3 EACH | Refills: 0 | Status: SHIPPED | OUTPATIENT
Start: 2021-11-29 | End: 2021-11-30

## 2021-11-29 RX ORDER — LISINOPRIL 30 MG/1
30 TABLET ORAL DAILY
Qty: 90 TABLET | Refills: 0 | Status: SHIPPED | OUTPATIENT
Start: 2021-11-29

## 2021-11-29 RX ORDER — METHYLPREDNISOLONE 4 MG/1
TABLET ORAL
Qty: 1 EACH | Refills: 0 | Status: SHIPPED | OUTPATIENT
Start: 2021-11-29 | End: 2022-02-07

## 2021-11-29 NOTE — PROGRESS NOTES
CHIEF COMPLAINT:     Patient presents with:  Headache: Started in Sept. HA on Left side of Head. OTC tylenol sinus. HTN: Pt does not check BP at home.        HPI:   Aria Murray is a 46year old female   Patient presents for recheck of their hypertensi • montelukast (SINGULAIR) 10 MG Oral Tab Take 1 tablet (10 mg total) by mouth daily. 90 tablet 0   • HUMIRA PEN 40 MG/0.4ML Subcutaneous Pen-injector Kit INJECT 1 PEN (40 MG) UNDER THE SKIN (SUBCUTANEOUS INJECTION) EVERY 7 DAYS.  4 each 2   • MERCAPTOPURI wheezes or rhonchi. Breathing is non labored.   CARDIO: RRR without murmur  EXTREMITIES: no cyanosis, clubbing or edema       ASSESSMENT AND PLAN:     Essential hypertension, benign  (primary encounter diagnosis)  Left maxillary sinusitis  Mild persistent a daily.  Dispense: 20 capsule; Refill: 0  - methylPREDNISolone (MEDROL) 4 MG Oral Tablet Therapy Pack; As directed. Dispense: 1 each; Refill: 0    3. Mild persistent asthma without complication  - continue current medications, add Singulair daily.  Pt to ca

## 2021-11-30 ENCOUNTER — PATIENT MESSAGE (OUTPATIENT)
Dept: INTERNAL MEDICINE CLINIC | Facility: CLINIC | Age: 51
End: 2021-11-30

## 2021-11-30 DIAGNOSIS — J45.30 MILD PERSISTENT ASTHMA WITHOUT COMPLICATION: ICD-10-CM

## 2021-11-30 RX ORDER — BUDESONIDE AND FORMOTEROL FUMARATE DIHYDRATE 160; 4.5 UG/1; UG/1
2 AEROSOL RESPIRATORY (INHALATION) 2 TIMES DAILY
Qty: 3 EACH | Refills: 0 | Status: SHIPPED | OUTPATIENT
Start: 2021-11-30 | End: 2021-11-30

## 2021-11-30 RX ORDER — ALBUTEROL SULFATE 90 UG/1
AEROSOL, METERED RESPIRATORY (INHALATION)
Qty: 8.5 G | Refills: 0 | Status: SHIPPED | OUTPATIENT
Start: 2021-11-30

## 2021-11-30 RX ORDER — BUDESONIDE AND FORMOTEROL FUMARATE DIHYDRATE 160; 4.5 UG/1; UG/1
AEROSOL RESPIRATORY (INHALATION)
Qty: 30.6 G | Refills: 0 | Status: SHIPPED | OUTPATIENT
Start: 2021-11-30 | End: 2022-02-07

## 2021-11-30 NOTE — TELEPHONE ENCOUNTER
From: Mauro Giordano  To: VIRGINIA Guzman  Sent: 11/30/2021 9:41 AM CST  Subject: Prescriptions    Marilee Trejo,    Can you please submit a prescription for Symbicort?  You submitted a prescription for the generic of Symbicort which my insurance company denied

## 2021-12-02 NOTE — PROGRESS NOTES
CBD shows macrocytosis. She is on mercaptopurine. LFT's show persistent alk phos elevation. A previous liver biopsy showed steatosis. I would like to get a GGT. She should also have a CBC and LFT's in 3 months with office visit to follow.     Results se

## 2021-12-27 ENCOUNTER — IMMUNIZATION (OUTPATIENT)
Dept: LAB | Facility: HOSPITAL | Age: 51
End: 2021-12-27
Attending: EMERGENCY MEDICINE
Payer: COMMERCIAL

## 2021-12-27 DIAGNOSIS — Z23 NEED FOR VACCINATION: Primary | ICD-10-CM

## 2021-12-27 PROCEDURE — 0013A SARSCOV2 VAC 100MCG/0.5ML IM: CPT

## 2022-01-10 PROBLEM — J32.0 CHRONIC MAXILLARY SINUSITIS: Status: ACTIVE | Noted: 2022-01-10

## 2022-01-10 PROBLEM — G44.029 CHRONIC CLUSTER HEADACHE, NOT INTRACTABLE: Status: ACTIVE | Noted: 2022-01-10

## 2022-02-01 ENCOUNTER — HOSPITAL ENCOUNTER (OUTPATIENT)
Dept: CT IMAGING | Age: 52
Discharge: HOME | End: 2022-02-01
Attending: OTOLARYNGOLOGY
Payer: COMMERCIAL

## 2022-02-01 DIAGNOSIS — J32.0 CHRONIC MAXILLARY SINUSITIS: ICD-10-CM

## 2022-02-01 PROCEDURE — 70486 CT MAXILLOFACIAL W/O DYE: CPT | Performed by: OTOLARYNGOLOGY

## 2022-02-02 NOTE — PROGRESS NOTES
Please call and tell her that her left cheek sinus isn't opened properly. Have her come in to discuss CT and options.

## 2022-02-24 DIAGNOSIS — J45.30 MILD PERSISTENT ASTHMA WITHOUT COMPLICATION: ICD-10-CM

## 2022-02-27 DIAGNOSIS — J45.30 MILD PERSISTENT ASTHMA WITHOUT COMPLICATION: ICD-10-CM

## 2022-02-27 RX ORDER — MONTELUKAST SODIUM 10 MG/1
10 TABLET ORAL DAILY
Qty: 30 TABLET | Refills: 0 | Status: SHIPPED | OUTPATIENT
Start: 2022-02-27 | End: 2022-02-27

## 2022-02-27 RX ORDER — MONTELUKAST SODIUM 10 MG/1
10 TABLET ORAL DAILY
Qty: 30 TABLET | Refills: 0 | Status: SHIPPED | OUTPATIENT
Start: 2022-02-27

## 2022-02-28 RX ORDER — MONTELUKAST SODIUM 10 MG/1
TABLET ORAL
Qty: 90 TABLET | Refills: 0 | OUTPATIENT
Start: 2022-02-28

## 2022-03-30 RX ORDER — LISINOPRIL 30 MG/1
TABLET ORAL
Qty: 90 TABLET | Refills: 0 | Status: SHIPPED | OUTPATIENT
Start: 2022-03-30

## 2022-04-06 ENCOUNTER — OFFICE VISIT (OUTPATIENT)
Dept: INTERNAL MEDICINE CLINIC | Facility: CLINIC | Age: 52
End: 2022-04-06
Payer: COMMERCIAL

## 2022-04-06 VITALS
SYSTOLIC BLOOD PRESSURE: 118 MMHG | RESPIRATION RATE: 16 BRPM | DIASTOLIC BLOOD PRESSURE: 76 MMHG | HEIGHT: 62 IN | BODY MASS INDEX: 33.68 KG/M2 | HEART RATE: 76 BPM | WEIGHT: 183 LBS

## 2022-04-06 DIAGNOSIS — I10 ESSENTIAL HYPERTENSION, BENIGN: ICD-10-CM

## 2022-04-06 DIAGNOSIS — E66.9 CLASS 1 OBESITY WITH BODY MASS INDEX (BMI) OF 33.0 TO 33.9 IN ADULT, UNSPECIFIED OBESITY TYPE, UNSPECIFIED WHETHER SERIOUS COMORBIDITY PRESENT: ICD-10-CM

## 2022-04-06 DIAGNOSIS — E78.5 HYPERLIPIDEMIA, UNSPECIFIED HYPERLIPIDEMIA TYPE: ICD-10-CM

## 2022-04-06 DIAGNOSIS — Z51.81 ENCOUNTER FOR THERAPEUTIC DRUG LEVEL MONITORING: Primary | ICD-10-CM

## 2022-04-06 PROCEDURE — 99215 OFFICE O/P EST HI 40 MIN: CPT | Performed by: PHYSICIAN ASSISTANT

## 2022-04-06 PROCEDURE — 3078F DIAST BP <80 MM HG: CPT | Performed by: PHYSICIAN ASSISTANT

## 2022-04-06 PROCEDURE — 3074F SYST BP LT 130 MM HG: CPT | Performed by: PHYSICIAN ASSISTANT

## 2022-04-06 PROCEDURE — 3008F BODY MASS INDEX DOCD: CPT | Performed by: PHYSICIAN ASSISTANT

## 2022-04-06 RX ORDER — TOPIRAMATE 25 MG/1
25 TABLET ORAL 2 TIMES DAILY
Qty: 60 TABLET | Refills: 1 | Status: SHIPPED | OUTPATIENT
Start: 2022-04-06

## 2022-04-07 RX ORDER — TOPIRAMATE 25 MG/1
TABLET ORAL
Qty: 180 TABLET | Refills: 0 | OUTPATIENT
Start: 2022-04-07

## 2022-04-07 NOTE — TELEPHONE ENCOUNTER
Denied request to change topirmate to 90 day since it is NOT required for insurance to cover.  I asked pharmacy to leave as written

## 2022-07-03 DIAGNOSIS — I10 ESSENTIAL HYPERTENSION, BENIGN: ICD-10-CM

## 2022-07-03 RX ORDER — LISINOPRIL 30 MG/1
TABLET ORAL
Qty: 90 TABLET | Refills: 0 | OUTPATIENT
Start: 2022-07-03

## 2022-07-08 ENCOUNTER — OFFICE VISIT (OUTPATIENT)
Dept: INTERNAL MEDICINE CLINIC | Facility: CLINIC | Age: 52
End: 2022-07-08
Payer: COMMERCIAL

## 2022-07-08 VITALS
RESPIRATION RATE: 16 BRPM | BODY MASS INDEX: 33.68 KG/M2 | SYSTOLIC BLOOD PRESSURE: 124 MMHG | HEIGHT: 62 IN | HEART RATE: 73 BPM | WEIGHT: 183 LBS | TEMPERATURE: 98 F | DIASTOLIC BLOOD PRESSURE: 76 MMHG

## 2022-07-08 DIAGNOSIS — I10 ESSENTIAL HYPERTENSION, BENIGN: ICD-10-CM

## 2022-07-08 DIAGNOSIS — J45.30 MILD PERSISTENT ASTHMA WITHOUT COMPLICATION: ICD-10-CM

## 2022-07-08 DIAGNOSIS — E78.5 HYPERLIPIDEMIA, UNSPECIFIED HYPERLIPIDEMIA TYPE: Primary | ICD-10-CM

## 2022-07-08 DIAGNOSIS — Z12.31 VISIT FOR SCREENING MAMMOGRAM: ICD-10-CM

## 2022-07-08 PROCEDURE — 3078F DIAST BP <80 MM HG: CPT | Performed by: FAMILY MEDICINE

## 2022-07-08 PROCEDURE — 3074F SYST BP LT 130 MM HG: CPT | Performed by: FAMILY MEDICINE

## 2022-07-08 PROCEDURE — 3008F BODY MASS INDEX DOCD: CPT | Performed by: FAMILY MEDICINE

## 2022-07-08 PROCEDURE — 99214 OFFICE O/P EST MOD 30 MIN: CPT | Performed by: FAMILY MEDICINE

## 2022-07-08 RX ORDER — LISINOPRIL 30 MG/1
30 TABLET ORAL DAILY
Qty: 90 TABLET | Refills: 0 | Status: CANCELLED | OUTPATIENT
Start: 2022-07-08

## 2022-07-08 RX ORDER — LISINOPRIL 30 MG/1
30 TABLET ORAL DAILY
Qty: 90 TABLET | Refills: 1 | Status: SHIPPED | OUTPATIENT
Start: 2022-07-08

## 2022-08-20 ENCOUNTER — APPOINTMENT (OUTPATIENT)
Dept: LAB | Age: 52
End: 2022-08-20
Attending: INTERNAL MEDICINE
Payer: COMMERCIAL

## 2022-08-20 ENCOUNTER — HOSPITAL ENCOUNTER (OUTPATIENT)
Dept: MAMMOGRAPHY | Age: 52
Discharge: HOME | End: 2022-08-20
Attending: FAMILY MEDICINE
Payer: COMMERCIAL

## 2022-08-20 DIAGNOSIS — Z12.31 VISIT FOR SCREENING MAMMOGRAM: ICD-10-CM

## 2022-08-20 PROCEDURE — 77063 BREAST TOMOSYNTHESIS BI: CPT | Performed by: FAMILY MEDICINE

## 2022-08-20 PROCEDURE — 77067 SCR MAMMO BI INCL CAD: CPT | Performed by: FAMILY MEDICINE

## 2023-04-12 ENCOUNTER — OFFICE VISIT (OUTPATIENT)
Facility: LOCATION | Age: 53
End: 2023-04-12
Payer: COMMERCIAL

## 2023-04-12 DIAGNOSIS — L72.3 SEBACEOUS CYST: Primary | ICD-10-CM

## 2023-04-20 ENCOUNTER — OFFICE VISIT (OUTPATIENT)
Facility: LOCATION | Age: 53
End: 2023-04-20
Payer: COMMERCIAL

## 2023-04-20 VITALS — HEART RATE: 82 BPM | TEMPERATURE: 97 F

## 2023-04-20 DIAGNOSIS — L72.3 SEBACEOUS CYST: Primary | ICD-10-CM

## 2023-04-20 PROCEDURE — 88304 TISSUE EXAM BY PATHOLOGIST: CPT | Performed by: SURGERY

## 2023-04-27 ENCOUNTER — OFFICE VISIT (OUTPATIENT)
Facility: LOCATION | Age: 53
End: 2023-04-27

## 2023-04-27 VITALS — HEART RATE: 73 BPM | TEMPERATURE: 98 F

## 2023-04-27 DIAGNOSIS — Z48.89 ENCOUNTER FOR POSTOPERATIVE WOUND CHECK: ICD-10-CM

## 2023-04-27 DIAGNOSIS — Z98.890 POST-OPERATIVE STATE: Primary | ICD-10-CM

## 2023-04-27 PROCEDURE — 99024 POSTOP FOLLOW-UP VISIT: CPT | Performed by: PHYSICIAN ASSISTANT

## 2023-04-27 RX ORDER — CLINDAMYCIN HYDROCHLORIDE 300 MG/1
300 CAPSULE ORAL 3 TIMES DAILY
Qty: 21 CAPSULE | Refills: 0 | Status: SHIPPED | OUTPATIENT
Start: 2023-04-27 | End: 2023-05-04

## 2023-05-04 ENCOUNTER — OFFICE VISIT (OUTPATIENT)
Facility: LOCATION | Age: 53
End: 2023-05-04

## 2023-05-04 VITALS — TEMPERATURE: 98 F

## 2023-05-04 DIAGNOSIS — L72.3 SEBACEOUS CYST: Primary | ICD-10-CM

## 2023-05-04 PROCEDURE — 99024 POSTOP FOLLOW-UP VISIT: CPT | Performed by: SURGERY

## 2023-08-10 ENCOUNTER — HOSPITAL ENCOUNTER (EMERGENCY)
Age: 53
Discharge: HOME | End: 2023-08-10
Attending: EMERGENCY MEDICINE
Payer: COMMERCIAL

## 2023-08-10 VITALS
DIASTOLIC BLOOD PRESSURE: 89 MMHG | WEIGHT: 180 LBS | TEMPERATURE: 98 F | HEART RATE: 98 BPM | HEIGHT: 62 IN | SYSTOLIC BLOOD PRESSURE: 159 MMHG | OXYGEN SATURATION: 96 % | BODY MASS INDEX: 33.13 KG/M2 | RESPIRATION RATE: 17 BRPM

## 2023-08-10 DIAGNOSIS — U07.1 COVID-19 VIRUS INFECTION: Primary | ICD-10-CM

## 2023-08-10 LAB
POCT INFLUENZA A: NEGATIVE
POCT INFLUENZA B: NEGATIVE
SARS-COV-2 RNA RESP QL NAA+PROBE: DETECTED

## 2023-08-10 PROCEDURE — 87502 INFLUENZA DNA AMP PROBE: CPT

## 2023-08-10 PROCEDURE — 99284 EMERGENCY DEPT VISIT MOD MDM: CPT

## 2023-08-10 PROCEDURE — 87430 STREP A AG IA: CPT | Performed by: EMERGENCY MEDICINE

## 2023-08-10 PROCEDURE — 99283 EMERGENCY DEPT VISIT LOW MDM: CPT

## 2023-08-10 PROCEDURE — 87430 STREP A AG IA: CPT

## 2023-08-10 PROCEDURE — 87502 INFLUENZA DNA AMP PROBE: CPT | Performed by: EMERGENCY MEDICINE

## 2023-10-26 ENCOUNTER — TELEPHONE (OUTPATIENT)
Facility: LOCATION | Age: 53
End: 2023-10-26

## 2023-10-26 NOTE — TELEPHONE ENCOUNTER
Patient called looking for records of procedure done in 2021 along with visit notes. Request will be sent to retrieve paper chart. Will follow up next week with patient.

## 2024-02-06 ENCOUNTER — APPOINTMENT (OUTPATIENT)
Dept: CT IMAGING | Age: 54
End: 2024-02-06
Attending: EMERGENCY MEDICINE
Payer: COMMERCIAL

## 2024-02-06 ENCOUNTER — HOSPITAL ENCOUNTER (EMERGENCY)
Age: 54
Discharge: HOME | End: 2024-02-06
Attending: EMERGENCY MEDICINE
Payer: COMMERCIAL

## 2024-02-06 ENCOUNTER — APPOINTMENT (OUTPATIENT)
Dept: GENERAL RADIOLOGY | Age: 54
End: 2024-02-06
Attending: EMERGENCY MEDICINE
Payer: COMMERCIAL

## 2024-02-06 VITALS
TEMPERATURE: 98 F | OXYGEN SATURATION: 98 % | SYSTOLIC BLOOD PRESSURE: 138 MMHG | WEIGHT: 189 LBS | DIASTOLIC BLOOD PRESSURE: 80 MMHG | HEIGHT: 62 IN | BODY MASS INDEX: 34.78 KG/M2 | HEART RATE: 95 BPM | RESPIRATION RATE: 18 BRPM

## 2024-02-06 DIAGNOSIS — J45.901 ASTHMA EXACERBATION, MILD: ICD-10-CM

## 2024-02-06 DIAGNOSIS — J98.01 BRONCHOSPASM: Primary | ICD-10-CM

## 2024-02-06 LAB
ALBUMIN SERPL-MCNC: 3.8 G/DL (ref 3.4–5)
ALBUMIN/GLOB SERPL: 0.8 {RATIO} (ref 1–2)
ALP LIVER SERPL-CCNC: 164 U/L
ALT SERPL-CCNC: 35 U/L
ANION GAP SERPL CALC-SCNC: 8 MMOL/L (ref 0–18)
AST SERPL-CCNC: 19 U/L (ref 15–37)
BASOPHILS # BLD AUTO: 0.1 X10(3) UL (ref 0–0.2)
BASOPHILS NFR BLD AUTO: 0.9 %
BILIRUB SERPL-MCNC: 0.4 MG/DL (ref 0.1–2)
BUN BLD-MCNC: 10 MG/DL (ref 9–23)
CALCIUM BLD-MCNC: 9.2 MG/DL (ref 8.5–10.1)
CHLORIDE SERPL-SCNC: 106 MMOL/L (ref 98–112)
CO2 SERPL-SCNC: 22 MMOL/L (ref 21–32)
CREAT BLD-MCNC: 1.27 MG/DL
EGFRCR SERPLBLD CKD-EPI 2021: 50 ML/MIN/1.73M2 (ref 60–?)
EOSINOPHIL # BLD AUTO: 1.79 X10(3) UL (ref 0–0.7)
EOSINOPHIL NFR BLD AUTO: 16.8 %
ERYTHROCYTE [DISTWIDTH] IN BLOOD BY AUTOMATED COUNT: 12.4 %
GLOBULIN PLAS-MCNC: 4.8 G/DL (ref 2.8–4.4)
GLUCOSE BLD-MCNC: 104 MG/DL (ref 70–99)
HCT VFR BLD AUTO: 41 %
HGB BLD-MCNC: 13.6 G/DL
IMM GRANULOCYTES # BLD AUTO: 0.08 X10(3) UL (ref 0–1)
IMM GRANULOCYTES NFR BLD: 0.8 %
LYMPHOCYTES # BLD AUTO: 3.84 X10(3) UL (ref 1–4)
LYMPHOCYTES NFR BLD AUTO: 36.1 %
MCH RBC QN AUTO: 32.7 PG (ref 26–34)
MCHC RBC AUTO-ENTMCNC: 33.2 G/DL (ref 31–37)
MCV RBC AUTO: 98.6 FL
MONOCYTES # BLD AUTO: 0.52 X10(3) UL (ref 0.1–1)
MONOCYTES NFR BLD AUTO: 4.9 %
NEUTROPHILS # BLD AUTO: 4.3 X10 (3) UL (ref 1.5–7.7)
NEUTROPHILS # BLD AUTO: 4.3 X10(3) UL (ref 1.5–7.7)
NEUTROPHILS NFR BLD AUTO: 40.5 %
NT-PROBNP SERPL-MCNC: 55 PG/ML (ref ?–125)
OSMOLALITY SERPL CALC.SUM OF ELEC: 281 MOSM/KG (ref 275–295)
PLATELET # BLD AUTO: 389 10(3)UL (ref 150–450)
POCT INFLUENZA A: NEGATIVE
POCT INFLUENZA B: NEGATIVE
POTASSIUM SERPL-SCNC: 3.9 MMOL/L (ref 3.5–5.1)
PROT SERPL-MCNC: 8.6 G/DL (ref 6.4–8.2)
RBC # BLD AUTO: 4.16 X10(6)UL
SARS-COV-2 RNA RESP QL NAA+PROBE: NOT DETECTED
SODIUM SERPL-SCNC: 136 MMOL/L (ref 136–145)
TROPONIN I SERPL HS-MCNC: 5 NG/L
WBC # BLD AUTO: 10.6 X10(3) UL (ref 4–11)

## 2024-02-06 PROCEDURE — 71260 CT THORAX DX C+: CPT | Performed by: EMERGENCY MEDICINE

## 2024-02-06 PROCEDURE — 80053 COMPREHEN METABOLIC PANEL: CPT | Performed by: EMERGENCY MEDICINE

## 2024-02-06 PROCEDURE — 93010 ELECTROCARDIOGRAM REPORT: CPT

## 2024-02-06 PROCEDURE — 87502 INFLUENZA DNA AMP PROBE: CPT | Performed by: EMERGENCY MEDICINE

## 2024-02-06 PROCEDURE — 71045 X-RAY EXAM CHEST 1 VIEW: CPT | Performed by: EMERGENCY MEDICINE

## 2024-02-06 PROCEDURE — 94640 AIRWAY INHALATION TREATMENT: CPT

## 2024-02-06 PROCEDURE — 96374 THER/PROPH/DIAG INJ IV PUSH: CPT

## 2024-02-06 PROCEDURE — 93005 ELECTROCARDIOGRAM TRACING: CPT

## 2024-02-06 PROCEDURE — 96361 HYDRATE IV INFUSION ADD-ON: CPT

## 2024-02-06 PROCEDURE — 99285 EMERGENCY DEPT VISIT HI MDM: CPT

## 2024-02-06 PROCEDURE — 99284 EMERGENCY DEPT VISIT MOD MDM: CPT

## 2024-02-06 PROCEDURE — 85025 COMPLETE CBC W/AUTO DIFF WBC: CPT | Performed by: EMERGENCY MEDICINE

## 2024-02-06 PROCEDURE — 83880 ASSAY OF NATRIURETIC PEPTIDE: CPT | Performed by: EMERGENCY MEDICINE

## 2024-02-06 PROCEDURE — 84484 ASSAY OF TROPONIN QUANT: CPT | Performed by: EMERGENCY MEDICINE

## 2024-02-06 RX ORDER — PREDNISONE 20 MG/1
40 TABLET ORAL DAILY
Qty: 10 TABLET | Refills: 0 | Status: SHIPPED | OUTPATIENT
Start: 2024-02-06 | End: 2024-02-11

## 2024-02-06 RX ORDER — BUDESONIDE AND FORMOTEROL FUMARATE DIHYDRATE 160; 4.5 UG/1; UG/1
AEROSOL RESPIRATORY (INHALATION) 2 TIMES DAILY
COMMUNITY

## 2024-02-06 RX ORDER — ALBUTEROL SULFATE 2.5 MG/3ML
2.5 SOLUTION RESPIRATORY (INHALATION) ONCE
Status: COMPLETED | OUTPATIENT
Start: 2024-02-06 | End: 2024-02-06

## 2024-02-06 RX ORDER — METHYLPREDNISOLONE SODIUM SUCCINATE 125 MG/2ML
125 INJECTION, POWDER, LYOPHILIZED, FOR SOLUTION INTRAMUSCULAR; INTRAVENOUS ONCE
Status: COMPLETED | OUTPATIENT
Start: 2024-02-06 | End: 2024-02-06

## 2024-02-07 LAB
ATRIAL RATE: 102 BPM
P AXIS: 54 DEGREES
P-R INTERVAL: 146 MS
Q-T INTERVAL: 396 MS
QRS DURATION: 74 MS
QTC CALCULATION (BEZET): 516 MS
R AXIS: 16 DEGREES
T AXIS: 40 DEGREES
VENTRICULAR RATE: 102 BPM

## 2024-02-07 NOTE — ED PROVIDER NOTES
Patient Seen in: [facility] In [place]      History     Chief Complaint   Patient presents with    Difficulty Breathing     Stated Complaint: difficulty breathing, dry cough, left sided chest pain, shaking. History of ast*    Subjective:   HPI    Patient comes to the emergency department with left-sided anterior chest discomfort, shortness of breath along with 2 weeks of coughing.  The patient states that the chest discomfort and shortness of breath have been mild during the 2 weeks of coughing, but have significantly increased over the past 24 hours.  The patient does have a history of asthma, but has not noted any wheezing.  She has been using her normal doses of inhalers without any improvement of her symptoms.  Her symptoms do worsen with physical exertion.  She has had no fever or chills. The chest discomfort is a vague, dull discomfort. It is not sharp or tearing and does not radiate. The chest discomfort is constant. It does not worsen nor is precipitated by exertion.    Objective:   Past Medical History:   Diagnosis Date    Asthma     Crohn disease (HCC)     Essential hypertension     Extrinsic asthma, unspecified     Sinusitis 09/16/2013              Past Surgical History:   Procedure Laterality Date    COLONOSCOPY  1/13 9/14    [facility]    COLPOSCOPY, CERVIX W/UPPER ADJACENT VAGINA; W/BIOPSY(S), CERVIX      HERNIA SURGERY  1999    OTHER SURGICAL HISTORY  2000/2015    laparoscopic sigmoid colon resection x2    REDUCTION LEFT      14 years ago.     REDUCTION OF LARGE BREAST  2005    REDUCTION RIGHT      14 years ago.     REMOVE TONSILS/ADENOIDS,<11 Y/O      UNLISTED PROC, HYSTEROSCOPY, UTERUS      w/ resection for intrauterine polyp removal                Social History     Socioeconomic History    Marital status: Single   Tobacco Use    Smoking status: Never    Smokeless tobacco: Never   Vaping Use    Vaping Use: Never used   Substance and Sexual Activity    Alcohol use: Not Currently     Drug use: No   Other Topics Concern    Caffeine Concern No    Special Diet No    Exercise Yes     Comment: 4-5x/week.     Seat Belt Yes              Review of Systems    Positive for stated complaint: difficulty breathing, dry cough, left sided chest pain, shaking. History of ast*  Other systems are as noted in HPI.  Constitutional and vital signs reviewed.      All other systems reviewed and negative except as noted above.    Physical Exam     ED Triage Vitals   BP 02/06/24 2012 142/88   Pulse 02/06/24 2003 (!) 122   Resp 02/06/24 2003 24   Temp 02/06/24 2012 97.9 °F (36.6 °C)   Temp src 02/06/24 2012 Temporal   SpO2 02/06/24 2003 97 %   O2 Device 02/06/24 2003 None (Room air)       Current:/80   Pulse 95   Temp 97.9 °F (36.6 °C) (Temporal)   Resp 18   Ht 157.5 cm (5' 2\")   Wt 85.7 kg   SpO2 98%   BMI 34.57 kg/m²         Physical Exam  Vitals and nursing note reviewed.   Constitutional:       Appearance: She is well-developed.   HENT:      Head: Normocephalic.   Cardiovascular:      Rate and Rhythm: Regular rhythm. Tachycardia present.      Heart sounds: Normal heart sounds. No murmur heard.  Pulmonary:      Effort: Pulmonary effort is normal. No respiratory distress.      Breath sounds: Normal breath sounds. No wheezing.      Comments: Patient is actively coughing without any labored respirations.  Pulse oximetry is noted to be in the high 90s on room air.  Chest:      Chest wall: Tenderness present.      Comments: Left anterior chest wall discomfort is noted with palpation and also with deep inspiration.  Abdominal:      General: Bowel sounds are normal.      Palpations: Abdomen is soft.      Tenderness: There is no abdominal tenderness. There is no rebound.   Musculoskeletal:         General: No tenderness. Normal range of motion.      Cervical back: Normal range of motion and neck supple.   Lymphadenopathy:      Cervical: No cervical adenopathy.   Skin:     General: Skin is warm and dry.       Findings: No rash.   Neurological:      Mental Status: She is alert and oriented to person, place, and time.      Sensory: No sensory deficit.              ED Course     Labs Reviewed   COMP METABOLIC PANEL (14) - Abnormal; Notable for the following components:       Result Value    Glucose 104 (*)     Creatinine 1.27 (*)     eGFR-Cr 50 (*)     Alkaline Phosphatase 164 (*)     Total Protein 8.6 (*)     Globulin  4.8 (*)     A/G Ratio 0.8 (*)     All other components within normal limits   CBC W/ DIFFERENTIAL - Abnormal; Notable for the following components:    Eosinophil Absolute 1.79 (*)     All other components within normal limits   TROPONIN I HIGH SENSITIVITY - Normal   PRO BETA NATRIURETIC PEPTIDE - Normal   RAPID SARS-COV-2 BY PCR - Normal   POCT FLU TEST - Normal    Narrative:     This assay is a rapid molecular in vitro test utilizing nucleic acid amplification of influenza A and B viral RNA.   CBC WITH DIFFERENTIAL WITH PLATELET    Narrative:     The following orders were created for panel order CBC With Differential With Platelet.  Procedure                               Abnormality         Status                     ---------                               -----------         ------                     CBC W/ DIFFERENTIAL[[id number]]          Abnormal            Final result                 Please view results for these tests on the individual orders.   SCAN SLIDE   RAINBOW DRAW BLUE   RAINBOW DRAW GOLD     EKG    Rate, intervals and axes as noted on EKG Report.  Rate: 102  Rhythm: Sinus Rhythm  Reading: Sinus tachycardia, otherwise unremarkable.           ED Course as of 02/07/24 1436  ------------------------------------------------------------  Time: 02/06 2059  Value: POCT INFLUENZA A: Negative  Comment: (Reviewed)  ------------------------------------------------------------  Time: 02/06 2059  Value: POCT INFLUENZA B: Negative  Comment:  (Reviewed)  ------------------------------------------------------------  Time: 02/06 2108  Value: pro-BETA NATRIURETIC PEPTIDE: 55  Comment: (Reviewed)  ------------------------------------------------------------  Time: 02/06 2108  Value: Troponin I (High Sensitivity): 5  Comment: (Reviewed)  ------------------------------------------------------------  Time: 02/06 2113  Value: XR CHEST AP PORTABLE  (CPT=71045)  Comment: Images were independently viewed by me and no infiltrate was noted.  ------------------------------------------------------------  Time: 02/06 2118  Comment: I discussed with the patient the patient's mildly elevated creatinine.  Given her tachycardia and lack of obvious wheezing/asthmatic physical exam, I felt that the risk of missing a pulmonary embolus outweighed the risk of potential kidney injury from iodinated contrast.  I explained this to the patient and the patient was agreeable and with shared decision making agreed to proceed with CT angiogram of the chest.  ------------------------------------------------------------  Time: 02/06 2119  Value: Rapid SARS-CoV-2 by PCR: Not Detected  Comment: (Reviewed)  ------------------------------------------------------------  Time: 02/06 2147  Comment: Patient was given IV Solu-Medrol along with albuterol with some improvement of symptoms.     Medications   albuterol (Ventolin) (2.5 MG/3ML) 0.083% nebulizer solution 2.5 mg (2.5 mg Nebulization Given 2/6/24 2120)   methylPREDNISolone sodium succinate (Solu-MEDROL) injection 125 mg (125 mg Intravenous Given 2/6/24 2130)   sodium chloride 0.9 % IV bolus 1,000 mL (0 mL Intravenous Stopped 2/6/24 2316)   iopamidol 76% (ISOVUE-370) injection for power injector (100 mL Intravenous Given 2/6/24 2140)     CT angiogram was performed because of the patient's dyspnea, tachycardia and lack of wheezing.    Heart Score:    HEART Score      Title      Criteria Score   Age: 45-64 Age Score: 1   History: Slightly  Suspicious Hx Score: 0     EKG: Normal EKG Score: 0   HTN: Yes   Hypercholesterolemia: Yes   Atherosclerosis/PVD: No     DM: No   BMI>30kg/m2: Yes   Smoking: No   Family History: No         Other Risk Factor Score: 3             Lab Results   Component Value Date    TROP <0.045 08/22/2021    TROPHS 5 02/06/2024           HEART Score: 3        Risk of adverse cardiac event is 0.9-1.7%                 MDM      The patient comes to the emergency department with 2 weeks of coughing and 2 days of increasing shortness of breath and left sided chest pressure. She had asthma but was not wheezing. She was tachycardic. Differential diagnosis included asthma, pneumonia, myocardial ischemia and pulmonary embolus.  Troponin was negative and HEART Score scored low probability for MACE. Patient's chest CT was negative for PE. There were findings suggestive of bronchitis. Patient received solumedrol and albuterol with subjective improvement of symptoms. She never had hypoxia, wheezing or respiratory distress. Patient's history, continued coughing and objective workup was most consistent with bronchitis and bronchospasm. She was discharged home with prescription for prednisone. She stated she had sufficient quantity of albuterol at home. She was instructed to follow up with her primary care physician and to return immediately for any change or worsening of symptoms.                                   Medical Decision Making      Disposition and Plan     Clinical Impression:  1. Bronchospasm    2. Asthma exacerbation, mild         Disposition:  Discharge  2/6/2024 11:15 pm    Follow-up:  Andria Cassidy MD  [id number] [address]  [facility] [address]  [phone]    Follow up            Medications Prescribed:  Discharge Medication List as of 2/6/2024 11:17 PM        START taking these medications    Details   predniSONE 20 MG Oral Tab Take 2 tablets (40 mg total) by mouth daily for 5 days., Normal, Disp-10 tablet, R-0

## 2024-02-07 NOTE — ED INITIAL ASSESSMENT (HPI)
Pt reports shortness of breath with exertion for the past two weeks. Hx of asthma. States she is using her inhalers twice a day with no relief. Reports lt side rib pain that is worse when she coughs for the past two days.   no

## 2024-10-28 ENCOUNTER — HOSPITAL ENCOUNTER (EMERGENCY)
Age: 54
Discharge: HOME | End: 2024-10-28
Attending: EMERGENCY MEDICINE
Payer: COMMERCIAL

## 2024-10-28 ENCOUNTER — APPOINTMENT (OUTPATIENT)
Dept: CT IMAGING | Age: 54
End: 2024-10-28
Attending: EMERGENCY MEDICINE
Payer: COMMERCIAL

## 2024-10-28 VITALS
OXYGEN SATURATION: 99 % | SYSTOLIC BLOOD PRESSURE: 120 MMHG | DIASTOLIC BLOOD PRESSURE: 79 MMHG | RESPIRATION RATE: 16 BRPM | HEART RATE: 88 BPM | WEIGHT: 195 LBS | HEIGHT: 62 IN | TEMPERATURE: 98 F | BODY MASS INDEX: 35.88 KG/M2

## 2024-10-28 DIAGNOSIS — R10.9 ABDOMINAL PAIN OF UNKNOWN ETIOLOGY: Primary | ICD-10-CM

## 2024-10-28 DIAGNOSIS — K52.9 GASTROENTERITIS: ICD-10-CM

## 2024-10-28 LAB
ALBUMIN SERPL-MCNC: 3.7 G/DL (ref 3.4–5)
ALBUMIN/GLOB SERPL: 0.8 {RATIO} (ref 1–2)
ALP LIVER SERPL-CCNC: 152 U/L
ALT SERPL-CCNC: 54 U/L
ANION GAP SERPL CALC-SCNC: 5 MMOL/L (ref 0–18)
AST SERPL-CCNC: 29 U/L (ref 15–37)
BASOPHILS # BLD AUTO: 0.09 X10(3) UL (ref 0–0.2)
BASOPHILS NFR BLD AUTO: 0.8 %
BILIRUB SERPL-MCNC: 0.4 MG/DL (ref 0.1–2)
BILIRUB UR QL STRIP.AUTO: NEGATIVE
BUN BLD-MCNC: 10 MG/DL (ref 9–23)
C DIFF TOX B STL QL: NEGATIVE
CALCIUM BLD-MCNC: 9.8 MG/DL (ref 8.5–10.1)
CHLORIDE SERPL-SCNC: 105 MMOL/L (ref 98–112)
CO2 SERPL-SCNC: 23 MMOL/L (ref 21–32)
COLOR UR AUTO: YELLOW
CREAT BLD-MCNC: 1.12 MG/DL
EGFRCR SERPLBLD CKD-EPI 2021: 58 ML/MIN/1.73M2 (ref 60–?)
EOSINOPHIL # BLD AUTO: 1.25 X10(3) UL (ref 0–0.7)
EOSINOPHIL NFR BLD AUTO: 10.5 %
ERYTHROCYTE [DISTWIDTH] IN BLOOD BY AUTOMATED COUNT: 13 %
GLOBULIN PLAS-MCNC: 4.6 G/DL (ref 2.8–4.4)
GLUCOSE BLD-MCNC: 105 MG/DL (ref 70–99)
GLUCOSE UR STRIP.AUTO-MCNC: NEGATIVE MG/DL
HCT VFR BLD AUTO: 41.7 %
HGB BLD-MCNC: 13.8 G/DL
IMM GRANULOCYTES # BLD AUTO: 0.08 X10(3) UL (ref 0–1)
IMM GRANULOCYTES NFR BLD: 0.7 %
KETONES UR STRIP.AUTO-MCNC: NEGATIVE MG/DL
LIPASE SERPL-CCNC: 21 U/L (ref 12–53)
LYMPHOCYTES # BLD AUTO: 4.8 X10(3) UL (ref 1–4)
LYMPHOCYTES NFR BLD AUTO: 40.4 %
MCH RBC QN AUTO: 33.9 PG (ref 26–34)
MCHC RBC AUTO-ENTMCNC: 33.1 G/DL (ref 31–37)
MCV RBC AUTO: 102.5 FL
MONOCYTES # BLD AUTO: 0.5 X10(3) UL (ref 0.1–1)
MONOCYTES NFR BLD AUTO: 4.2 %
NEUTROPHILS # BLD AUTO: 5.16 X10 (3) UL (ref 1.5–7.7)
NEUTROPHILS # BLD AUTO: 5.16 X10(3) UL (ref 1.5–7.7)
NEUTROPHILS NFR BLD AUTO: 43.4 %
NITRITE UR QL STRIP.AUTO: NEGATIVE
OSMOLALITY SERPL CALC.SUM OF ELEC: 275 MOSM/KG (ref 275–295)
PH UR STRIP.AUTO: 5 [PH] (ref 5–8)
PLATELET # BLD AUTO: 439 10(3)UL (ref 150–450)
POCT INFLUENZA A: NEGATIVE
POCT INFLUENZA B: NEGATIVE
POTASSIUM SERPL-SCNC: 4.6 MMOL/L (ref 3.5–5.1)
PROT SERPL-MCNC: 8.3 G/DL (ref 6.4–8.2)
PROT UR STRIP.AUTO-MCNC: NEGATIVE MG/DL
RBC # BLD AUTO: 4.07 X10(6)UL
RBC UR QL AUTO: NEGATIVE
SARS-COV-2 RNA RESP QL NAA+PROBE: NOT DETECTED
SODIUM SERPL-SCNC: 133 MMOL/L (ref 136–145)
SP GR UR STRIP.AUTO: >=1.03 (ref 1–1.03)
UROBILINOGEN UR STRIP.AUTO-MCNC: 0.2 MG/DL
WBC # BLD AUTO: 11.9 X10(3) UL (ref 4–11)

## 2024-10-28 PROCEDURE — 96375 TX/PRO/DX INJ NEW DRUG ADDON: CPT

## 2024-10-28 PROCEDURE — 85025 COMPLETE CBC W/AUTO DIFF WBC: CPT | Performed by: EMERGENCY MEDICINE

## 2024-10-28 PROCEDURE — 83993 ASSAY FOR CALPROTECTIN FECAL: CPT | Performed by: EMERGENCY MEDICINE

## 2024-10-28 PROCEDURE — 99285 EMERGENCY DEPT VISIT HI MDM: CPT

## 2024-10-28 PROCEDURE — 83690 ASSAY OF LIPASE: CPT | Performed by: EMERGENCY MEDICINE

## 2024-10-28 PROCEDURE — 87502 INFLUENZA DNA AMP PROBE: CPT

## 2024-10-28 PROCEDURE — 81015 MICROSCOPIC EXAM OF URINE: CPT | Performed by: EMERGENCY MEDICINE

## 2024-10-28 PROCEDURE — 80053 COMPREHEN METABOLIC PANEL: CPT | Performed by: EMERGENCY MEDICINE

## 2024-10-28 PROCEDURE — 96374 THER/PROPH/DIAG INJ IV PUSH: CPT

## 2024-10-28 PROCEDURE — 81001 URINALYSIS AUTO W/SCOPE: CPT | Performed by: EMERGENCY MEDICINE

## 2024-10-28 PROCEDURE — 87502 INFLUENZA DNA AMP PROBE: CPT | Performed by: EMERGENCY MEDICINE

## 2024-10-28 PROCEDURE — 87493 C DIFF AMPLIFIED PROBE: CPT | Performed by: EMERGENCY MEDICINE

## 2024-10-28 PROCEDURE — 99284 EMERGENCY DEPT VISIT MOD MDM: CPT

## 2024-10-28 PROCEDURE — 96361 HYDRATE IV INFUSION ADD-ON: CPT

## 2024-10-28 PROCEDURE — 74177 CT ABD & PELVIS W/CONTRAST: CPT | Performed by: EMERGENCY MEDICINE

## 2024-10-28 PROCEDURE — 87507 IADNA-DNA/RNA PROBE TQ 12-25: CPT | Performed by: EMERGENCY MEDICINE

## 2024-10-28 RX ORDER — ONDANSETRON 2 MG/ML
4 INJECTION INTRAMUSCULAR; INTRAVENOUS ONCE
Status: COMPLETED | OUTPATIENT
Start: 2024-10-28 | End: 2024-10-28

## 2024-10-28 RX ORDER — MERCAPTOPURINE 50 MG/1
50 TABLET ORAL DAILY
COMMUNITY

## 2024-10-28 RX ORDER — DICYCLOMINE HCL 20 MG
20 TABLET ORAL 4 TIMES DAILY PRN
Qty: 30 TABLET | Refills: 0 | Status: SHIPPED | OUTPATIENT
Start: 2024-10-28 | End: 2024-11-27

## 2024-10-28 RX ORDER — ONDANSETRON 4 MG/1
4 TABLET, ORALLY DISINTEGRATING ORAL EVERY 4 HOURS PRN
Qty: 10 TABLET | Refills: 0 | Status: SHIPPED | OUTPATIENT
Start: 2024-10-28 | End: 2024-11-04

## 2024-10-28 RX ORDER — KETOROLAC TROMETHAMINE 15 MG/ML
15 INJECTION, SOLUTION INTRAMUSCULAR; INTRAVENOUS ONCE
Status: COMPLETED | OUTPATIENT
Start: 2024-10-28 | End: 2024-10-28

## 2024-10-28 NOTE — ED PROVIDER NOTES
Patient Seen in: [facility] In [place]      History     Chief Complaint   Patient presents with    Abdomen/Flank Pain    Fever     Stated Complaint: body aches, \"i think its my chrons\", chills and fever    Subjective:   HPI      54-year-old female comes to the hospital [facility] of having difficulty with diarrhea.  She had about 15 episodes of chest pain she is having a lot of pain greatest on the left side of her abdomen than right.  Has had chills unsure if she has had fevers.  She does have his were having Crohn's disease and feels this feels like it is about.  She denies any blood in her stool.  She had some nausea but denies any vomiting.  Has no recent travel history or known exposures.  She is denying any other complaints at this time.    Objective:     Past Medical History:    Asthma (HCC)    Crohn disease (HCC)    Essential hypertension    Extrinsic asthma, unspecified    Sinusitis              Past Surgical History:   Procedure Laterality Date    Colonoscopy  1/13 9/14    [facility]    Colposcopy, cervix w/upper adjacent vagina; w/biopsy(s), cervix      Hernia surgery  1999    Other surgical history  2000/2015    laparoscopic sigmoid colon resection x2    Reduction left      14 years ago.     Reduction of large breast  2005    Reduction right      14 years ago.     Remove tonsils/adenoids,<11 y/o      Unlisted proc, hysteroscopy, uterus      w/ resection for intrauterine polyp removal                Social History     Socioeconomic History    Marital status: Single   Tobacco Use    Smoking status: Never    Smokeless tobacco: Never   Vaping Use    Vaping status: Never Used   Substance and Sexual Activity    Alcohol use: Not Currently    Drug use: No   Other Topics Concern    Caffeine Concern No    Special Diet No    Exercise Yes     Comment: 4-5x/week.     Seat Belt Yes     Social Drivers of Health      [facility]    Social  Connections                  Physical Exam     ED Triage Vitals   BP 10/28/24 1209 (!) 134/91   Pulse 10/28/24 1209 100   Resp 10/28/24 1209 20   Temp 10/28/24 1207 98.6 °F (37 °C)   Temp src 10/28/24 1207 Temporal   SpO2 10/28/24 1209 95 %   O2 Device 10/28/24 1209 None (Room air)       Current Vitals:   Vital Signs  BP: 122/85  Pulse: 95  Resp: 17  Temp: 98.2 °F (36.8 °C)  Temp src: Temporal    Oxygen Therapy  SpO2: 98 %  O2 Device: None (Room air)        Physical Exam  HEENT : NCAT, EOMI, PEERL,  neck supple, no JVD, trachea midline, No LAD  Heart: S1S2 normal. No murmurs, regular rate and rhythm  Lungs: Clear to auscultation bilaterally  Abdomen: Soft left greater than right regions of her abdomen are tender nondistended normal active bowel sounds without rebound, guarding or masses noted  Back nontender without CVA tenderness  Extremity no clubbing, cyanosis or edema noted.  Full range of motion noted without tenderness  Neuro: No focal deficits noted    All measures to prevent infection transmission during my interaction with the patient were taken.  The patient was already wearing droplet mask on my arrival to the room.  Personal protective equipment including a droplet mask as well as gloves were worn throughout the duration of my exam.  Hand washing was performed prior to and after the exam.  Stethoscope and equipment used during my examination was cleaned with a super Sani cloth germicidal wipe following the exam.    ED Course     Labs Reviewed   URINALYSIS, ROUTINE - Abnormal; Notable for the following components:       Result Value    Clarity Urine Slightly Cloudy (*)     Leukocyte Esterase Urine Moderate (*)     All other components within normal limits   CBC WITH DIFFERENTIAL WITH PLATELET - Abnormal; Notable for the following components:    WBC 11.9 (*)     .5 (*)     Lymphocyte Absolute 4.80 (*)     Eosinophil Absolute 1.25 (*)     All other components within normal limits   COMP METABOLIC  PANEL (14) - Abnormal; Notable for the following components:    Glucose 105 (*)     Sodium 133 (*)     Creatinine 1.12 (*)     eGFR-Cr 58 (*)     Alkaline Phosphatase 152 (*)     Total Protein 8.3 (*)     Globulin  4.6 (*)     A/G Ratio 0.8 (*)     All other components within normal limits   UA MICROSCOPIC ONLY, URINE - Abnormal; Notable for the following components:    Squamous Epi. Cells Moderate (*)     Ca Oxalate Crystals Few (*)     All other components within normal limits   LIPASE - Normal   RAPID SARS-COV-2 BY PCR - Normal   POCT FLU TEST - Normal    Narrative:     This assay is a rapid molecular in vitro test utilizing nucleic acid amplification of influenza A and B viral RNA.   SCAN SLIDE   CALPROTECTIN, FECAL   C. DIFFICILE(TOXIGENIC)PCR   GI STOOL PANEL BY PCR       ED Course as of 10/28/24 1651  ------------------------------------------------------------  Time: 10/28 1649  Comment: While the urine showed no sign of infection.  The patient white count was 11.9 thousand.  The patient's CMP was relatively unremarkable.  Urine showed no sign of infection.  She had a influenza and COVID test that were negative.  She has a CT of the abdomen pelvis that I interpreted showing no acute pathology.  Read the radiology report.  She is feeling better after IV fluid, Toradol and Zofran were given.  I spoke with GI and the patient had stool panel, C. difficile as well as fecal calprotectin sent at this time will be discharged with prompt follow-up.       CT ABDOMEN+PELVIS(CONTRAST ONLY)(CPT=74177)    Result Date: 10/28/2024  PROCEDURE:  CT ABDOMEN+PELVIS (CONTRAST ONLY) (CPT=74177)  COMPARISON:  [facility], CT, CT ABDOMEN+PELVIS(CONTRAST ONLY)(CPT=74177), 4/19/2018, 7:00 PM.  [facility] CT, CT ABDOMEN+PELVIS(CPT=74176), 7/11/2014, 4:14 PM.  INDICATIONS:  body aches, i think its my chrons, chills and fever  TECHNIQUE:  CT scanning was performed from the dome of the diaphragm to the pubic symphysis with non-ionic  intravenous contrast material. Post contrast coronal MPR imaging was performed.  Dose reduction techniques were used. Dose information is transmitted to the ACR (American College of Radiology) NRDR (National Radiology Data Registry) which includes the Dose Index Registry.  PATIENT STATED HISTORY:(As transcribed by Technologist)  Generalized weakness and abdominal pain since yesterday. Fever and chills.   CONTRAST USED:  100cc of Isovue 370  FINDINGS:  LUNG BASES:  Dependent atelectasis bilaterally. LIVER:  Normal in shape and contour.  Mild hepatic steatosis. BILIARY:  Gallbladder is unremarkable in appearance.  No intrahepatic biliary dilatation.. SPLEEN:  Normal.  No enlargement or focal lesion. PANCREAS:  No nicanor-pancreatic inflammatory stranding ADRENALS:  Normal.  No mass or enlargement.  KIDNEYS:  Kidneys are symmetrical in size without evidence of hydronephrosis. BOWEL/MESENTERY:  Bowel is normal in caliber. No evidence of obstruction.  Postoperative changes noted with partial colectomy..  Probable area of omental infarct noted within the left abdomen. PELVIS:  Bladder is unremarkable in appearance.  No free pelvic fluid.   AORTA/VASCULAR:  Aorta is normal in caliber.  Mild atheromatous calcifications. BONES:  No acute fractures.            CONCLUSION:  No acute findings.  Mild hepatic steatosis.   LOCATION:  [id number]   Dictated by (CST): Shaun Ralph MD on 10/28/2024 at 4:11 PM     Finalized by (CST): Shaun Ralph MD on 10/28/2024 at 4:13 PM        Medications   ketorolac (Toradol) 15 MG/ML injection 15 mg (15 mg Intravenous Given 10/28/24 1425)   sodium chloride 0.9 % IV bolus 1,000 mL (0 mL Intravenous Stopped 10/28/24 1525)   ondansetron (Zofran) 4 MG/2ML injection 4 mg (4 mg Intravenous Given 10/28/24 1425)   iopamidol 76% (ISOVUE-370) injection for power injector (100 mL Intravenous Given 10/28/24 1540)            MDM      Differential diagnosis includes Crohn's exacerbation, infectious diarrhea,  gastroenteritis but not limited to such.  The patient stool studies have been sent.  CT is not showing significant Crohn's flare at this time.  I spoke with the patient's gastroenterologist and at this time the patient has had stool studies sent and will have prompt outpatient follow-up.  She is feeling better after IV fluid and Toradol given.        Medical Decision Making      Disposition and Plan     Clinical Impression:  1. Abdominal pain of unknown etiology    2. Gastroenteritis         Disposition:  Discharge  10/28/2024  4:51 pm    Follow-up:  Armani Langley MD  [address]  [facility] [address] [phone]    Schedule an appointment as soon as possible for a visit in 2 day(s)      Andria Cassidy MD  [address]  [facility] [address] [phone]    Schedule an appointment as soon as possible for a visit in 2 day(s)            Medications Prescribed:  Current Discharge Medication List        START taking these medications    Details   dicyclomine 20 MG Oral Tab Take 1 tablet (20 mg total) by mouth 4 (four) times daily as needed.  Qty: 30 tablet, Refills: 0      ondansetron 4 MG Oral Tablet Dispersible Take 1 tablet (4 mg total) by mouth every 4 (four) hours as needed for Nausea.  Qty: 10 tablet, Refills: 0                 Supplementary Documentation:

## 2024-10-28 NOTE — ED INITIAL ASSESSMENT (HPI)
Generalized weakness, fever, chills and abd pain since yesterday.  Has taken imodium and tylenol.

## 2024-10-29 LAB
ADENOVIRUS F 40/41 PCR: NEGATIVE
ASTROVIRUS PCR: NEGATIVE
C CAYETANENSIS DNA SPEC QL NAA+PROBE: NEGATIVE
CALPROTECTIN STL-MCNT: 10.6 ΜG/G (ref ?–50)
CAMPY SP DNA.DIARRHEA STL QL NAA+PROBE: NEGATIVE
CRYPTOSP DNA SPEC QL NAA+PROBE: NEGATIVE
EAEC PAA PLAS AGGR+AATA ST NAA+NON-PRB: NEGATIVE
EC STX1+STX2 + H7 FLIC SPEC NAA+PROBE: NEGATIVE
ENTAMOEBA HISTOLYTICA PCR: NEGATIVE
EPEC EAE GENE STL QL NAA+NON-PROBE: NEGATIVE
ETEC LTA+ST1A+ST1B TOX ST NAA+NON-PROBE: NEGATIVE
GIARDIA LAMBLIA PCR: NEGATIVE
NOROVIRUS GI/GII PCR: NEGATIVE
P SHIGELLOIDES DNA STL QL NAA+PROBE: NEGATIVE
ROTAVIRUS A PCR: NEGATIVE
SALMONELLA DNA SPEC QL NAA+PROBE: NEGATIVE
SAPOVIRUS PCR: NEGATIVE
SHIGELLA SP+EIEC IPAH ST NAA+NON-PROBE: NEGATIVE
V CHOLERAE DNA SPEC QL NAA+PROBE: NEGATIVE
VIBRIO DNA SPEC NAA+PROBE: NEGATIVE
YERSINIA DNA SPEC NAA+PROBE: NEGATIVE

## (undated) DIAGNOSIS — I10 ESSENTIAL HYPERTENSION, BENIGN: ICD-10-CM

## (undated) NOTE — LETTER
09/22/20        [facility] [facility] [address]      Dear Mignon Mortimer,    [address] [facility] records indicate that you have outstanding lab work and or testing that was ordered for you and has not yet been completed:  Orders Placed This Encounter

## (undated) NOTE — LETTER
23    Patient: Arlette Encarnacion  : 1970 Visit date: 2023    Dear  Jerman Marcelo MD    Thank you for referring Arlette Encarnacion to my practice. Please find my assessment and plan below. Assessment   Sebaceous cyst  (primary encounter diagnosis)      Plan     The patient will be scheduled for an office excision of sebaceous cyst.    The nicaonr-operative care plan was discussed with the patient, who voices understanding. Activity and lifting recommendations were discussed in length. The risks, benefits, and alternatives to the procedure were explained to the patient. The risks explained include, but are not limited to, bleeding, infection, pain wound complications, recurrence, incorrect diagnosis, injury to adjacent organs and structures. We also discussed the possibile need for further therapeutic, diagnostic, or surgical intervention. The patient voiced understanding, and after all questions were answered to the patient's satisfaction, the patient provided willing and informed consent to proceed.         Sincerely,       Brianne Anderson MD   CC:   No Recipients

## (undated) NOTE — ED AVS SNAPSHOT
Courtney Menon   MRN: [id number]    Department:  [facility] in [place]   Date of Visit:  12/27/2019           Disclosure     Insurance plans vary and the physician(s) referred by the ER may not be covered by your plan.  Please conta tell this physician (or your personal doctor if your instructions are to return to your personal doctor) about any new or lasting problems. The primary care or specialist physician will see patients referred from the [facility] Emergency Department.  Rafael Wang

## (undated) NOTE — LETTER
ASTHMA ACTION PLAN for Lauryn Ferrell     : 1970     Date: 2021  Provider:  VIRGINIA Shelby  Phone for doctor or clinic: [address] [facility] [address]  [place] [phone] [id number] [phone]    [facility]

## (undated) NOTE — MR AVS SNAPSHOT
[place] [address] [phone]               Thank you for choosing us for your health care visit with Joe Porras MD.  We are glad to serve you and happy to provide you with this summary of Inject 40 mg into the skin every 14 (fourteen) days. Commonly known as:  HUMIRA PEN           Albuterol Sulfate  (90 Base) MCG/ACT Aers   Inhale 2 puffs into the lungs every 4 (four) hours as needed.  For wheezing   Commonly known as:  PROAIR HFA

## (undated) NOTE — Clinical Note
Hello    EMG was done on this patient; no suggestion of R carpal tunnel syndrome.     Thanks  Jody Hector MD, Neurology  [facility]  Pager [phone]  6/28/2021

## (undated) NOTE — LETTER
10/12/18        [facility] [facility] [address]      Dear Gabbie Myles,    [address] [place] [address] records indicate that you have outstanding lab work and or testing that was ordered for you and has not yet been completed:  Orders Placed This Encounter

## (undated) NOTE — MR AVS SNAPSHOT
[place]  [address] [facility] [address]  [address] [facility] [address] [phone]               Thank you for choosing us for your health care visit with Torrey Rae NP.   We are glad to serve you and happy to provide you with this sum Your physician has referred you to a specialist.  Your physician or the clinic staff will provide you with the phone number you should call to schedule your appointment.      If you are confident that your benefit plan will not require a referral or authori Take 2 tabs once daily   Commonly known as:  PURINETHOL           MULTIVITAMIN OR   Take 1 Tab by mouth daily.            Phentermine HCl 37.5 MG Tabs   Take 1 tablet (37.5 mg total) by mouth every morning before breakfast.   Commonly known as:  ADIPEX-P

## (undated) NOTE — LETTER
08/21/19        [facility] [facility] [address]      Dear Stan Lopez records indicate that you have outstanding lab work and or testing that was ordered for you and has not yet been completed: Fasting lab work + Mammogram  *fa Thank you,       VIRGINIA Esteves

## (undated) NOTE — ED AVS SNAPSHOT
Regla Hart   MRN: [id number]    Department:  THE [facility] Emergency Department in [place]   Date of Visit:  9/3/2017           Disclosure     Insurance plans vary and the physician(s) referred by the ER may not be covered by your plan.  Please contact If you have been prescribed any medication(s), please fill your prescription right away and begin taking the medication(s) as directed    If the emergency physician has read X-rays, these will be re-interpreted by a radiologist.  If there is a significant

## (undated) NOTE — LETTER
ASTHMA ACTION PLAN for Roldan Concepcion     : 1970     Date: 2020  Provider:  VIRGINIA Rodríguez  Phone for doctor or clinic: [facility] [address], [place]/ [address]  [address], [facility] 100  Grant Calderon [phone]

## (undated) NOTE — LETTER
23    Patient: Mukul Vernon  : 1970 Visit date: 2023    Dear  Favio Luis MD    Thank you for referring Mukul Vernon to my practice. Please find my assessment and plan below. Assessment   Sebaceous cyst  (primary encounter diagnosis)    Plan     The patient is recovering nicely following excision of epidermoid inclusion cyst of the upper back. The anticipated postoperative recovery was discussed with the patient in detail. Dietary, activity, and exercise recommendations along with restrictions were discussed with the patient during today's visit. Wound care instructions were discussed during today's visit. The patient will return to my attention on an as needed basis. The patient is encouraged to continue seeing the primary care physician for ongoing medical needs. The patient was given ample opportunity to ask questions. The patient's questions were answered in detail and to the patient's satisfaction. The patient voiced understanding of the postoperative care plan.            Sincerely,       Ta Pedersen MD   CC:   No Recipients

## (undated) NOTE — LETTER
ASTHMA ACTION PLAN for Thalia Dunbar     : 1970     Date: 2019  Provider:  Corrin Mcburney, APRN  Phone for doctor or clinic: [facility]0 [facility], [address] [place] [address], [place]/ [address]   [facility]  [facility] [phone]    ACT

## (undated) NOTE — MR AVS SNAPSHOT
[address] [id number]               Thank you for choosing us for your health care visit with Lashell Lopez MD.  We are glad to serve you and happy to provide you with this summary of Assoc Dx:  Encounter for therapeutic drug monitoring [Z51.81], Obesity (BMI 30-39. 9) [E66.9]           Basic Metabolic Panel (8) [E]    Complete by:   May 11, 2017 (Approximate)    Assoc Dx:  Encounter for therapeutic drug monitoring [Z51.81], Obesity (BMI taking this medication, and follow the directions you see here. Commonly known as:  HUMIRA PEN           Albuterol Sulfate  (90 Base) MCG/ACT Aers   Inhale 2 puffs into the lungs every 4 (four) hours as needed.  For wheezing   Commonly known as:  P

## (undated) NOTE — ED AVS SNAPSHOT
Ekaterina Camejo   MRN: [id number]    Department:  THE [facility] Emergency Department in [place]   Date of Visit:  4/19/2018           Disclosure     Insurance plans vary and the physician(s) referred by the ER may not be covered by your plan.  Please contac tell this physician (or your personal doctor if your instructions are to return to your personal doctor) about any new or lasting problems. The primary care or specialist physician will see patients referred from the [facility] Emergency Department.  Susan Strickland

## (undated) NOTE — Clinical Note
ASTHMA ACTION PLAN for Nitin [place]     : 1970     Date: 2017  Provider:  Mckinley Benítez NP  Phone for doctor or clinic: [facility]5 [address], [place]   [address] [place] [address]  [address] [id number] [phone]    YAQUELIN Weiss

## (undated) NOTE — MR AVS SNAPSHOT
[place]  [address] [facility]  [facility] [address]  [phone]               Thank you for choosing us for your health care visit with Ally Solis MD.  We are glad to serve you and happy to provide you with this sum mercaptopurine 50 MG Tabs   Take 2 tabs once daily   Commonly known as:  PURINETHOL           MULTIVITAMIN OR   Take 1 Tab by mouth daily. ZYRTEC ALLERGY OR   Take by mouth daily.                 Where to Get Your Medications      These me Visit [facility] online at  [email or web address]

## (undated) NOTE — MR AVS SNAPSHOT
[address] [id number]               Thank you for choosing us for your health care visit with Jaylin Nino RD.   We are glad to serve you and happy to provide you with this summary of Take 1 tablet (40 mg total) by mouth daily. Commonly known as:  PRINIVIL,ZESTRIL           mercaptopurine 50 MG Tabs   Take 2 tabs once daily   Commonly known as:  PURINETHOL           MULTIVITAMIN OR   Take 1 Tab by mouth daily.            Phentermine HC

## (undated) NOTE — LETTER
05/18/18        [facility] [place] [address] [facility] [address]      Dear Susan Ordonez,    [address] records indicate that you have outstanding lab work and or testing that was ordered for you and has not yet been completed:          [id number] [facility]